# Patient Record
Sex: FEMALE | Race: WHITE | Employment: OTHER | RURAL
[De-identification: names, ages, dates, MRNs, and addresses within clinical notes are randomized per-mention and may not be internally consistent; named-entity substitution may affect disease eponyms.]

---

## 2018-01-10 ENCOUNTER — CLINICAL SUPPORT (OUTPATIENT)
Dept: FAMILY MEDICINE CLINIC | Age: 68
End: 2018-01-10

## 2018-01-10 DIAGNOSIS — Z23 ENCOUNTER FOR IMMUNIZATION: Primary | ICD-10-CM

## 2018-01-10 NOTE — PATIENT INSTRUCTIONS
If you have any questions regarding [employer], you may call [employer] support at [phone].

## 2018-01-10 NOTE — MR AVS SNAPSHOT
Visit Information Date & Time Provider Department Dept. Phone Encounter #  
 1/10/2018  7:45 AM [facility] NURSE Wes Zaragoza [id number] Upcoming Health Maintenance Date Due Hepatitis C Screening 1950 FOBT Q 1 YEAR AGE 50-75 7/27/1998 GLAUCOMA SCREENING Q2Y 7/27/2013 MEDICARE YEARLY EXAM 7/1/2017 BREAST CANCER SCRN MAMMOGRAM 5/16/2019 DTaP/Tdap/Td series (2 - Td) 3/21/2021 Allergies as of 1/10/2018  Review Complete On: 5/16/2017 By: Alesia Proctor No Known Allergies Current Immunizations  Reviewed on 6/30/2016 Name Date Influenza High Dose Vaccine PF 10/11/2017, 10/17/2016, 9/29/2015 Pneumococcal Conjugate (PCV-13) 6/30/2016 Pneumococcal Polysaccharide (PPSV-23) 1/23/2014 Tdap 1/10/2018, 3/21/2011 Zoster Vaccine, Live 7/9/2012 Not reviewed this visit You Were Diagnosed With   
  
 Codes Comments Encounter for immunization    -  Primary ICD-10-CM: H24 ICD-9-CM: V03.89 Vitals OB Status Smoking Status Postmenopausal Never Smoker Preferred Pharmacy Pharmacy Name Phone [address] [facility] [address] [phone] Your Updated Medication List  
  
   
This list is accurate as of: 1/10/18  3:38 PM.  Always use your most recent med list.  
  
  
  
  
 levothyroxine 100 mcg tablet Commonly known as:  SYNTHROID  
TAKE ONE TABLET BY MOUTH ONCE DAILY BEFORE  BREAKFAST We Performed the Following TETANUS, DIPHTHERIA TOXOIDS AND ACELLULAR PERTUSSIS VACCINE (TDAP), IN INDIVIDS. >=7, IM [id number] CPT(R)] Patient Instructions If you have any questions regarding [employer], you may call [employer] support at [phone]. Introducing [facility]!    
 [facility] introduces [employer] patient portal. Now you can access parts of your medical record, email your doctor's office, and request medication refills online. 1. In your internet browser, go to [email or web address] 2. Click on the First Time User? Click Here link in the Sign In box. You will see the New Member Sign Up page. 3. Enter your [employer] Access Code exactly as it appears below. You will not need to use this code after youve completed the sign-up process. If you do not sign up before the expiration date, you must request a new code. · [employer] Access Code: [email or web address] Expires: 4/10/2018  3:38 PM 
 
4. Enter the last four digits of your Social Security Number (xxxx) and Date of Birth (mm/dd/yyyy) as indicated and click Submit. You will be taken to the next sign-up page. 5. Create a [employer] ID. This will be your [employer] login ID and cannot be changed, so think of one that is secure and easy to remember. 6. Create a [employer] password. You can change your password at any time. 7. Enter your Password Reset Question and Answer. This can be used at a later time if you forget your password. 8. Enter your e-mail address. You will receive e-mail notification when new information is available in [address] 9. Click Sign Up. You can now view and download portions of your medical record. 10. Click the Download Summary menu link to download a portable copy of your medical information. If you have questions, please visit the Frequently Asked Questions section of the [employer] website. Remember, [employer] is NOT to be used for urgent needs. For medical emergencies, dial 911. Now available from your iPhone and Android! Please provide this summary of care documentation to your next provider. Your primary care clinician is listed as Shirley Degroot. If you have any questions after today's visit, please call [phone].

## 2018-02-20 RX ORDER — LEVOTHYROXINE SODIUM 100 UG/1
TABLET ORAL
Qty: 90 TAB | Refills: 4 | Status: SHIPPED | OUTPATIENT
Start: 2018-02-20 | End: 2019-05-07 | Stop reason: SDUPTHER

## 2018-02-20 NOTE — TELEPHONE ENCOUNTER
[phone] contact # per Disha Juan, need refill or an appt which ever is needed for her to get medication as she'll be out of town with daughter who is do to have baby last of March - 1st of April for the following [place], [place] for the following medication:   Levothyroxine 100 mcg tablet

## 2019-05-07 RX ORDER — LEVOTHYROXINE SODIUM 100 UG/1
TABLET ORAL
Qty: 30 TAB | Refills: 14 | Status: SHIPPED | OUTPATIENT
Start: 2019-05-07 | End: 2020-07-20

## 2021-02-10 ENCOUNTER — IMMUNIZATION (OUTPATIENT)
Dept: PEDIATRICS CLINIC | Age: 71
End: 2021-02-10
Payer: MEDICARE

## 2021-02-10 DIAGNOSIS — Z23 ENCOUNTER FOR IMMUNIZATION: Primary | ICD-10-CM

## 2021-02-10 PROCEDURE — 91301 COVID-19, MRNA, LNP-S, PF, 100MCG/0.5ML DOSE(MODERNA): CPT | Performed by: FAMILY MEDICINE

## 2021-02-10 PROCEDURE — 0011A COVID-19, MRNA, LNP-S, PF, 100MCG/0.5ML DOSE(MODERNA): CPT | Performed by: FAMILY MEDICINE

## 2021-03-12 ENCOUNTER — IMMUNIZATION (OUTPATIENT)
Dept: PEDIATRICS CLINIC | Age: 71
End: 2021-03-12
Payer: MEDICARE

## 2021-03-12 DIAGNOSIS — Z23 ENCOUNTER FOR IMMUNIZATION: Primary | ICD-10-CM

## 2021-03-12 PROCEDURE — 91301 COVID-19, MRNA, LNP-S, PF, 100MCG/0.5ML DOSE(MODERNA): CPT | Performed by: FAMILY MEDICINE

## 2021-03-12 PROCEDURE — 0012A COVID-19, MRNA, LNP-S, PF, 100MCG/0.5ML DOSE(MODERNA): CPT | Performed by: FAMILY MEDICINE

## 2021-05-05 DIAGNOSIS — N30.00 ACUTE CYSTITIS WITHOUT HEMATURIA: Primary | ICD-10-CM

## 2021-05-05 RX ORDER — CIPROFLOXACIN 250 MG/1
250 TABLET, FILM COATED ORAL EVERY 12 HOURS
Qty: 10 TAB | Refills: 1 | Status: SHIPPED | OUTPATIENT
Start: 2021-05-05 | End: 2021-05-10

## 2021-06-14 ENCOUNTER — VIRTUAL VISIT (OUTPATIENT)
Dept: FAMILY MEDICINE CLINIC | Age: 71
End: 2021-06-14
Payer: MEDICARE

## 2021-06-14 DIAGNOSIS — M25.551 RIGHT HIP PAIN: Primary | ICD-10-CM

## 2021-06-14 PROCEDURE — G2025 DIS SITE TELE SVCS RHC/FQHC: HCPCS | Performed by: INTERNAL MEDICINE

## 2021-06-14 RX ORDER — GLUCOSAMINE/CHONDR SU A SOD 750-600 MG
1 TABLET ORAL
COMMUNITY

## 2021-06-14 NOTE — Clinical Note
Stephon Norman-  Please call [facility] and schedule Luisa Plascencia for eval for right hip pain after July 1.  X rays at [facility]  Thanks  Karey Eddy

## 2021-06-14 NOTE — PROGRESS NOTES
Kevin Fischer is a 65 y.o. female evaluated via telephone on 6/14/2021. Consent:  She and/or health care decision maker is aware that that she may receive a bill for this telephone service, depending on her insurance coverage, and has provided verbal consent to proceed: Yes    A/P:  1. Right hip pain  Checking plain films. Likely soft tissue. Would benefit from PT  - XR HIP RT W OR WO PELV 2-3 VWS; Future  - REFERRAL TO PHYSICAL THERAPY    HPI: Tez Vaughn is a 64-year-old woman who injured her right hip about a month ago. Now she notes pain in her right hip when she has to get up out of a chair. Walking is essentially painless. But the pressure that she senses when activating her thigh muscles on the right is fairly intense and she is worried about falling. She denies any bruising. She is currently not taking any medication for pain. She is agreeable to getting a plain x-ray and for physical therapy. Declines medication for pain. I affirm this is a Patient Initiated Episode with an Established Patient who has not had a related appointment within my department in the past 7 days or scheduled within the next 24 hours. On this date 06/14/2021 I have spent 20 minutes reviewing previous notes, test results and face to face with the patient discussing the diagnosis and importance of compliance with the treatment plan as well as documenting on the day of the visit.      Note: not billable if this call serves to triage the patient into an appointment for the relevant concern      May Arenas MD

## 2021-06-14 NOTE — PROGRESS NOTES
Lyric Macias is a 65 y.o. female presenting for/with:    Chief Complaint   Patient presents with    Hip Pain     (R) hip C/O pain x 1 month. states about that time she stumbled and possibly \"twisted\" that hip. No falls. Pain worse after sitting for a prolonged time. Pain better once moving and ambulation.  Referral Request     Requests possible Xray        There were no vitals taken for this visit. Pain Scale: 1/10  Pain Location: (R) hip    1. Have you been to the ER, urgent care clinic since your last visit? Hospitalized since your last visit? NO    2. Have you seen or consulted any other health care providers outside of the [address] [place] [address] since your last visit? Include any pap smears or colon screening. NO    Symptom review:  NO  Fever   NO  Shaking chills  NO  Cough  NO  Body aches  NO  Coughing up blood  NO  Chest congestion  NO  Chest pain  NO  Shortness of breath  NO  Profound Loss of smell/taste  NO  Nausea/Vomiting   NO  Loose stool/Diarrhea  NO  any skin issues    Patient Risk Factors Reviewed as follows:  NO  have you been in Close contact with confirmed COVID19 patient   NO  History of recent travel to affected geographical areas within the past 14 days  NO  COPD  NO  Active Cancer/Leukemia/Lymphoma/Chemotherapy  NO  Oral steroid use  NO  Pregnant  NO  Diabetes Mellitus  NO  Heart disease  NO  Asthma  NO Health care worker at home  NO Health care worker  NO Is there a Pregnant Woman in the home  NO Dialysis pt in the home   NO a large number of people living in the home    Learning Assessment 6/14/2021   PRIMARY LEARNER Patient   PRIMARY LANGUAGE ENGLISH   LEARNER PREFERENCE PRIMARY LISTENING   ANSWERED BY patient   RELATIONSHIP SELF     Fall Risk Assessment, last 12 mths 6/14/2021   Able to walk? Yes   Fall in past 12 months? 0   Do you feel unsteady?  0   Are you worried about falling 0       3 most recent PHQ Screens 6/14/2021   Little interest or pleasure in doing things Not at all   Feeling down, depressed, irritable, or hopeless Not at all   Total Score PHQ 2 0     Abuse Screening Questionnaire 6/14/2021   Do you ever feel afraid of your partner? N   Are you in a relationship with someone who physically or mentally threatens you? N   Is it safe for you to go home? Y       ADL Assessment 6/14/2021   Feeding yourself No Help Needed   Getting from bed to chair No Help Needed   Getting dressed No Help Needed   Bathing or showering No Help Needed   Walk across the room (includes cane/walker) No Help Needed   Using the telphone No Help Needed   Taking your medications No Help Needed   Preparing meals No Help Needed   Managing money (expenses/bills) No Help Needed   Moderately strenuous housework (laundry) No Help Needed   Shopping for personal items (toiletries/medicines) No Help Needed   Shopping for groceries No Help Needed   Driving No Help Needed   Climbing a flight of stairs No Help Needed   Getting to places beyond walking distances No Help Needed      Living will on file. No advance directive on file.  Emergency contact verified

## 2021-06-15 ENCOUNTER — HOSPITAL ENCOUNTER (OUTPATIENT)
Dept: GENERAL RADIOLOGY | Age: 71
Discharge: HOME | End: 2021-06-15
Payer: MEDICARE

## 2021-06-15 DIAGNOSIS — M25.551 RIGHT HIP PAIN: ICD-10-CM

## 2021-06-15 PROCEDURE — 73502 X-RAY EXAM HIP UNI 2-3 VIEWS: CPT

## 2021-08-23 ENCOUNTER — HOSPITAL ENCOUNTER (OUTPATIENT)
Dept: MAMMOGRAPHY | Age: 71
Discharge: HOME | End: 2021-08-23
Attending: INTERNAL MEDICINE
Payer: MEDICARE

## 2021-08-23 VITALS — WEIGHT: 130 LBS | BODY MASS INDEX: 20.98 KG/M2

## 2021-08-23 DIAGNOSIS — Z12.31 VISIT FOR SCREENING MAMMOGRAM: ICD-10-CM

## 2021-08-23 PROCEDURE — 77063 BREAST TOMOSYNTHESIS BI: CPT

## 2021-11-06 NOTE — PROGRESS NOTES
Ms. Donovan Paulino is a 66 y.o. female who is here for evaluation of No chief complaint on file. .       ASSESSMENT AND PLAN:    1. Medicare annual wellness visit, subsequent  2. Encounter for screening for colorectal malignant neoplasm  Will encourage patient to complete cologuard screening if not colonoscopy  3. Acquired hypothyroidism  She is due for labs  4. Pure hypercholesterolemia  Due for assessment. Orders Placed This Encounter    CBC WITH AUTOMATED DIFF     Standing Status:   Future     Standing Expiration Date:   12/10/2021    LIPID PANEL     Standing Status:   Future     Standing Expiration Date:       METABOLIC PANEL, COMPREHENSIVE     Standing Status:   Future     Standing Expiration Date:   12/10/2021    TSH 3RD GENERATION     Standing Status:   Future     Standing Expiration Date:   12/10/2021           HPI 70-year-old  woman with hypothyroidism and hyperlipidemia. There is a history of osteoporosis. Currently taking vitamin D. She arrives for subsequent annual wellness visit with age-appropriate screening and laboratory assessment and clinical assessment of her thyroid status, lipids and other concerns. ROS:  Denies fever, chills, cough, chest pain, SOB,  nausea, vomiting, or diarrhea. Denies wt loss, wt gain, hemoptysis, hematochezia or melena. Physical Examination:    Visit Vitals  /78 (BP 1 Location: Left upper arm, BP Patient Position: Sitting, BP Cuff Size: Adult)   Pulse 70   Temp (!) 96.6 °F (35.9 °C) (Temporal)   Resp 18   Ht 5' 6\" (1.676 m)   Wt 131 lb 6.4 oz (59.6 kg)   SpO2 98%   BMI 21.21 kg/m²      General:  Alert, cooperative, no distress. Head:  Normocephalic, without obvious abnormality, atraumatic. Eyes:  Conjunctivae/corneas clear. Pupils equal, round, reactive to light. Extraocular movements intact. Lungs:   Clear to auscultation bilaterally. Chest wall:  No tenderness or deformity. Cardiac:  RRR   Abdomen:   Soft, non-tender.  Bowel sounds normal. No masses. No organomegaly. Extremities: Extremities normal, atraumatic, no cyanosis or edema. Pulses: 2+ and symmetric all extremities. Skin: Skin color, texture, turgor normal. No rashes or lesions. Lymph nodes: Cervical, supraclavicular, and axillary nodes normal.   Neurologic: CNII-XII intact. Normal strength, sensation, and reflexes throughout. On this date 11/08/2021 I have spent 30 minutes reviewing previous notes, test results and face to face with the patient discussing the diagnosis and importance of compliance with the treatment plan as well as documenting on the day of the visit.     Zara Hernandez MD FACP    (signed electronically) on 11/8/2021 at 9:35 AM

## 2021-11-08 ENCOUNTER — OFFICE VISIT (OUTPATIENT)
Dept: FAMILY MEDICINE CLINIC | Age: 71
End: 2021-11-08
Payer: MEDICARE

## 2021-11-08 VITALS
OXYGEN SATURATION: 98 % | HEIGHT: 66 IN | TEMPERATURE: 96.6 F | SYSTOLIC BLOOD PRESSURE: 110 MMHG | BODY MASS INDEX: 21.12 KG/M2 | DIASTOLIC BLOOD PRESSURE: 78 MMHG | RESPIRATION RATE: 18 BRPM | WEIGHT: 131.4 LBS | HEART RATE: 70 BPM

## 2021-11-08 DIAGNOSIS — Z12.12 ENCOUNTER FOR SCREENING FOR COLORECTAL MALIGNANT NEOPLASM: ICD-10-CM

## 2021-11-08 DIAGNOSIS — Z12.11 ENCOUNTER FOR SCREENING FOR COLORECTAL MALIGNANT NEOPLASM: ICD-10-CM

## 2021-11-08 DIAGNOSIS — Z00.00 MEDICARE ANNUAL WELLNESS VISIT, SUBSEQUENT: Primary | ICD-10-CM

## 2021-11-08 DIAGNOSIS — E03.9 ACQUIRED HYPOTHYROIDISM: ICD-10-CM

## 2021-11-08 DIAGNOSIS — E78.00 PURE HYPERCHOLESTEROLEMIA: ICD-10-CM

## 2021-11-08 LAB
ALBUMIN SERPL-MCNC: 4.1 G/DL (ref 3.5–5)
ALBUMIN/GLOB SERPL: 1.3 {RATIO} (ref 1.1–2.2)
ALP SERPL-CCNC: 74 U/L (ref 45–117)
ALT SERPL-CCNC: 24 U/L (ref 12–78)
ANION GAP SERPL CALC-SCNC: 4 MMOL/L (ref 5–15)
AST SERPL-CCNC: 19 U/L (ref 15–37)
BASOPHILS # BLD: 0.1 K/UL (ref 0–0.1)
BASOPHILS NFR BLD: 1 % (ref 0–1)
BILIRUB SERPL-MCNC: 0.4 MG/DL (ref 0.2–1)
BUN SERPL-MCNC: 17 MG/DL (ref 6–20)
BUN/CREAT SERPL: 18 (ref 12–20)
CALCIUM SERPL-MCNC: 9.9 MG/DL (ref 8.5–10.1)
CHLORIDE SERPL-SCNC: 104 MMOL/L (ref 97–108)
CHOLEST SERPL-MCNC: 221 MG/DL
CO2 SERPL-SCNC: 31 MMOL/L (ref 21–32)
CREAT SERPL-MCNC: 0.92 MG/DL (ref 0.55–1.02)
DIFFERENTIAL METHOD BLD: ABNORMAL
EOSINOPHIL # BLD: 0.3 K/UL (ref 0–0.4)
EOSINOPHIL NFR BLD: 5 % (ref 0–7)
ERYTHROCYTE [DISTWIDTH] IN BLOOD BY AUTOMATED COUNT: 13.1 % (ref 11.5–14.5)
GLOBULIN SER CALC-MCNC: 3.2 G/DL (ref 2–4)
GLUCOSE SERPL-MCNC: 105 MG/DL (ref 65–100)
HCT VFR BLD AUTO: 43.6 % (ref 35–47)
HDLC SERPL-MCNC: 54 MG/DL
HDLC SERPL: 4.1 {RATIO} (ref 0–5)
HGB BLD-MCNC: 14 G/DL (ref 11.5–16)
IMM GRANULOCYTES # BLD AUTO: 0.1 K/UL (ref 0–0.04)
IMM GRANULOCYTES NFR BLD AUTO: 1 % (ref 0–0.5)
LDLC SERPL CALC-MCNC: 141.6 MG/DL (ref 0–100)
LYMPHOCYTES # BLD: 1.5 K/UL (ref 0.8–3.5)
LYMPHOCYTES NFR BLD: 26 % (ref 12–49)
MCH RBC QN AUTO: 30.6 PG (ref 26–34)
MCHC RBC AUTO-ENTMCNC: 32.1 G/DL (ref 30–36.5)
MCV RBC AUTO: 95.4 FL (ref 80–99)
MONOCYTES # BLD: 0.5 K/UL (ref 0–1)
MONOCYTES NFR BLD: 9 % (ref 5–13)
NEUTS SEG # BLD: 3.4 K/UL (ref 1.8–8)
NEUTS SEG NFR BLD: 58 % (ref 32–75)
NRBC # BLD: 0 K/UL (ref 0–0.01)
NRBC BLD-RTO: 0 PER 100 WBC
PLATELET # BLD AUTO: 264 K/UL (ref 150–400)
PMV BLD AUTO: 11.8 FL (ref 8.9–12.9)
POTASSIUM SERPL-SCNC: 4.1 MMOL/L (ref 3.5–5.1)
PROT SERPL-MCNC: 7.3 G/DL (ref 6.4–8.2)
RBC # BLD AUTO: 4.57 M/UL (ref 3.8–5.2)
SODIUM SERPL-SCNC: 139 MMOL/L (ref 136–145)
TRIGL SERPL-MCNC: 127 MG/DL (ref ?–150)
TSH SERPL DL<=0.05 MIU/L-ACNC: 0.31 UIU/ML (ref 0.36–3.74)
VLDLC SERPL CALC-MCNC: 25.4 MG/DL
WBC # BLD AUTO: 5.8 K/UL (ref 3.6–11)

## 2021-11-08 PROCEDURE — G0439 PPPS, SUBSEQ VISIT: HCPCS | Performed by: INTERNAL MEDICINE

## 2021-11-08 PROCEDURE — 99214 OFFICE O/P EST MOD 30 MIN: CPT | Performed by: INTERNAL MEDICINE

## 2021-11-08 NOTE — PROGRESS NOTES
Gi Mendoza is a 66 y.o. female presenting for/with:    No chief complaint on file. Visit Vitals  /78 (BP 1 Location: Left upper arm, BP Patient Position: Sitting, BP Cuff Size: Adult)   Pulse 70   Temp (!) 96.6 °F (35.9 °C) (Temporal)   Resp 18   Ht 5' 6\" (1.676 m)   Wt 131 lb 6.4 oz (59.6 kg)   SpO2 98%   BMI 21.21 kg/m²     Pain Scale: 0 - No pain/10  Pain Location:     1. Have you been to the ER, urgent care clinic since your last visit? Hospitalized since your last visit? NO    2. Have you seen or consulted any other health care providers outside of the [address] [place] [address] since your last visit? Include any pap smears or colon screening. NO    Symptom review:  NO  Fever   NO  Shaking chills  NO  Cough  NO  Body aches  NO  Coughing up blood  NO  Chest congestion  NO  Chest pain  NO  Shortness of breath  NO  Profound Loss of smell/taste  NO  Nausea/Vomiting   NO  Loose stool/Diarrhea  NO  any skin issues    Patient Risk Factors Reviewed as follows:  NO  have you been in Close contact with confirmed COVID19 patient   NO  History of recent travel to affected geographical areas within the past 14 days  NO  COPD  NO  Active Cancer/Leukemia/Lymphoma/Chemotherapy  NO  Oral steroid use  NO  Pregnant  NO  Diabetes Mellitus  NO  Heart disease  NO  Asthma  NO Health care worker at home  [address] care worker  NO Is there a Pregnant Woman in the home  NO Dialysis pt in the home   NO a large number of people living in the home    Learning Assessment 11/8/2021   PRIMARY LEARNER Patient   PRIMARY LANGUAGE ENGLISH   LEARNER PREFERENCE PRIMARY LISTENING     PICTURES   ANSWERED BY patient   RELATIONSHIP SELF     Fall Risk Assessment, last 12 mths 11/8/2021   Able to walk? Yes   Fall in past 12 months? 0   Do you feel unsteady?  0   Are you worried about falling 0       3 most recent PHQ Screens 11/8/2021   Little interest or pleasure in doing things Not at all   Feeling down, depressed, irritable, or hopeless Not at all   Total Score PHQ 2 0     Abuse Screening Questionnaire 11/8/2021   Do you ever feel afraid of your partner? N   Are you in a relationship with someone who physically or mentally threatens you? N   Is it safe for you to go home? Y       ADL Assessment 11/8/2021   Feeding yourself No Help Needed   Getting from bed to chair No Help Needed   Getting dressed No Help Needed   Bathing or showering No Help Needed   Walk across the room (includes cane/walker) No Help Needed   Using the telphone No Help Needed   Taking your medications No Help Needed   Preparing meals No Help Needed   Managing money (expenses/bills) No Help Needed   Moderately strenuous housework (laundry) No Help Needed   Shopping for personal items (toiletries/medicines) No Help Needed   Shopping for groceries No Help Needed   Driving No Help Needed   Climbing a flight of stairs No Help Needed   Getting to places beyond walking distances No Help Needed      Advance Care Planning 11/8/2021   Patient's Healthcare Decision Maker is: Named in scanned ACP document   Confirm Advance Directive Yes, on file   Patient Would Like to Complete Advance Directive -        This is the Subsequent Medicare Annual Wellness Exam, performed 12 months or more after the Initial AWV or the last Subsequent AWV    I have reviewed the patient's medical history in detail and updated the computerized patient record. Assessment/Plan   Education and counseling provided:  Are appropriate based on today's review and evaluation    1. Medicare annual wellness visit, subsequent  2. Encounter for screening for colorectal malignant neoplasm  3. Acquired hypothyroidism  4.  Pure hypercholesterolemia       Depression Risk Factor Screening     3 most recent PHQ Screens 11/8/2021   Little interest or pleasure in doing things Not at all   Feeling down, depressed, irritable, or hopeless Not at all   Total Score PHQ 2 0       Alcohol Risk Screen    Do you average more than 1 drink per night or more than 7 drinks a week:  No    On any one occasion in the past three months have you have had more than 3 drinks containing alcohol:  No        Functional Ability and Level of Safety    Hearing: Hearing is good. Activities of Daily Living: The home contains: no safety equipment. Patient does total self care      Ambulation: with no difficulty     Fall Risk:  Fall Risk Assessment, last 12 mths 11/8/2021   Able to walk? Yes   Fall in past 12 months? 0   Do you feel unsteady? 0   Are you worried about falling 0      Abuse Screen:  Patient is not abused       Cognitive Screening    Has your family/caregiver stated any concerns about your memory: no       Health Maintenance Due     Health Maintenance Due   Topic Date Due    Colorectal Cancer Screening Combo  Never done       Patient Care Team   Patient Care Team:  Reji Patricia MD as PCP - General (Internal Medicine)  Reji Patricia MD as PCP [facility] Empaneled Provider    History     Patient Active Problem List   Diagnosis Code    Hypothyroidism E03.9    Hyperlipidemia E78.5    Postmenopausal osteoporosis M81.0    Advance directive on file Z78.9     Past Medical History:   Diagnosis Date    Fracture of right wrist     Hyperlipidemia     Hypothyroidism     Menopause     Postmenopausal osteoporosis     Prolapse of female bladder, acquired     uterine prolapse      Past Surgical History:   Procedure Laterality Date    HX APPENDECTOMY      HX CHOLECYSTECTOMY      HX DILATION AND CURETTAGE       Current Outpatient Medications   Medication Sig Dispense Refill    Euthyrox 100 mcg tablet TAKE 1 TABLET BY MOUTH ONCE DAILY BEFORE BREAKFAST 90 Tablet 5    glucosamine/chondr ricardo A sod (glucosamine-chondroitin) 750-600 mg tab Take  by mouth.  States takes glucosamine approx 3 times weekly       No Known Allergies    Family History   Problem Relation Age of Onset    Cancer Father         liver    Hypertension Mother      Social History     Tobacco Use    Smoking status: Never Smoker    Smokeless tobacco: Never Used   Substance Use Topics    Alcohol use: Yes     Comment: Occ a glass of wine         Ester Payan

## 2021-11-08 NOTE — PATIENT INSTRUCTIONS

## 2021-11-09 DIAGNOSIS — E03.9 ACQUIRED HYPOTHYROIDISM: Primary | ICD-10-CM

## 2021-11-09 RX ORDER — LEVOTHYROXINE SODIUM 88 UG/1
88 TABLET ORAL
Qty: 90 TABLET | Refills: 4 | Status: SHIPPED | OUTPATIENT
Start: 2021-11-09

## 2021-11-09 NOTE — PROGRESS NOTES
Patient identified x2 factors and notified of lab results. All questions answered at this time.  Patient will need lab only appt in 3 months

## 2022-02-21 ENCOUNTER — DOCUMENTATION ONLY (OUTPATIENT)
Dept: FAMILY MEDICINE CLINIC | Age: 72
End: 2022-02-21

## 2022-02-21 DIAGNOSIS — Z12.11 SCREENING FOR COLON CANCER: Primary | ICD-10-CM

## 2022-02-23 LAB — HEMOCCULT STL QL IA: NEGATIVE

## 2022-07-01 ENCOUNTER — OFFICE VISIT (OUTPATIENT)
Dept: FAMILY MEDICINE CLINIC | Age: 72
End: 2022-07-01
Payer: MEDICARE

## 2022-07-01 VITALS
TEMPERATURE: 98.2 F | BODY MASS INDEX: 20.83 KG/M2 | DIASTOLIC BLOOD PRESSURE: 77 MMHG | HEIGHT: 66 IN | SYSTOLIC BLOOD PRESSURE: 126 MMHG | OXYGEN SATURATION: 98 % | RESPIRATION RATE: 18 BRPM | WEIGHT: 129.6 LBS | HEART RATE: 83 BPM

## 2022-07-01 DIAGNOSIS — R73.09 ELEVATED GLUCOSE: ICD-10-CM

## 2022-07-01 DIAGNOSIS — E03.9 ACQUIRED HYPOTHYROIDISM: Primary | ICD-10-CM

## 2022-07-01 DIAGNOSIS — N81.4 UTERINE PROLAPSE: ICD-10-CM

## 2022-07-01 DIAGNOSIS — E78.2 MIXED HYPERLIPIDEMIA: ICD-10-CM

## 2022-07-01 DIAGNOSIS — Z01.818 PRE-OP TESTING: ICD-10-CM

## 2022-07-01 DIAGNOSIS — H54.7 DIMINISHED VISION: ICD-10-CM

## 2022-07-01 DIAGNOSIS — E78.00 PURE HYPERCHOLESTEROLEMIA: ICD-10-CM

## 2022-07-01 PROCEDURE — 93010 ELECTROCARDIOGRAM REPORT: CPT | Performed by: INTERNAL MEDICINE

## 2022-07-01 PROCEDURE — 99214 OFFICE O/P EST MOD 30 MIN: CPT | Performed by: INTERNAL MEDICINE

## 2022-07-01 NOTE — PROGRESS NOTES
Miranda Orona is a 66 y.o. female presenting for/with:    Chief Complaint   Patient presents with    Pre-op Exam     Pre-op for [place] [address] Dr Ryanne Hadley on 8/2/2022 - Fax number [phone]. Procedure vaginal hysterectomy, possible bilateral salpingo-ooophorectomy, possible uterosacral ligament suspension    Pre-op Exam     Pre-op cataract on 8/17/2022 with Dr Avril Hernandez        Visit Vitals  /77 (BP 1 Location: Left upper arm, BP Patient Position: Sitting, BP Cuff Size: Adult long)   Pulse 83   Temp 98.2 °F (36.8 °C) (Temporal)   Resp 18   Ht 5' 6\" (1.676 m)   Wt 129 lb 9.6 oz (58.8 kg)   SpO2 98%   BMI 20.92 kg/m²     Pain Scale: 0 - No pain/10  Pain Location:     1. \"Have you been to the ER, urgent care clinic since your last visit? Hospitalized since your last visit? \" No    2. \"Have you seen or consulted any other health care providers outside of the [address] [place] [address] since your last visit? \" No     3. For patients aged 39-70: Has the patient had a colonoscopy / FIT/ Cologuard? Yes - no Care Gap present      If the patient is female:    4. For patients aged 41-77: Has the patient had a mammogram within the past 2 years? Yes - no Care Gap present      5. For patients aged 21-65: Has the patient had a pap smear?  NA - based on age or sex      Symptom review:  NO  Fever   NO  Shaking chills  NO  Cough  NO  Body aches  NO  Coughing up blood  NO  Chest congestion  NO  Chest pain  NO  Shortness of breath  NO  Profound Loss of smell/taste  NO  Nausea/Vomiting   NO  Loose stool/Diarrhea  NO  any skin issues    Patient Risk Factors Reviewed as follows:  NO  have you been in Close contact with confirmed COVID19 patient   NO  History of recent travel to affected geographical areas within the past 14 days  NO  COPD  NO  Active Cancer/Leukemia/Lymphoma/Chemotherapy  NO  Oral steroid use  NO  Pregnant  NO  Diabetes Mellitus  YES  Heart disease  NO  Asthma  NO Health care worker at home  [address] care worker  NO Is there a Pregnant Woman in the home  NO Dialysis pt in the home   NO a large number of people living in the home    Learning Assessment 11/8/2021   PRIMARY LEARNER Patient   PRIMARY LANGUAGE ENGLISH   LEARNER PREFERENCE PRIMARY LISTENING     PICTURES   ANSWERED BY patient   RELATIONSHIP SELF     Fall Risk Assessment, last 12 mths 7/1/2022   Able to walk? Yes   Fall in past 12 months? 0   Do you feel unsteady? 0   Are you worried about falling 0       3 most recent PHQ Screens 7/1/2022   Little interest or pleasure in doing things Not at all   Feeling down, depressed, irritable, or hopeless Not at all   Total Score PHQ 2 0     Abuse Screening Questionnaire 7/1/2022   Do you ever feel afraid of your partner? N   Are you in a relationship with someone who physically or mentally threatens you? N   Is it safe for you to go home?  Y       ADL Assessment 7/1/2022   Feeding yourself No Help Needed   Getting from bed to chair No Help Needed   Getting dressed No Help Needed   Bathing or showering No Help Needed   Walk across the room (includes cane/walker) No Help Needed   Using the telphone No Help Needed   Taking your medications No Help Needed   Preparing meals No Help Needed   Managing money (expenses/bills) No Help Needed   Moderately strenuous housework (laundry) No Help Needed   Shopping for personal items (toiletries/medicines) No Help Needed   Shopping for groceries No Help Needed   Driving No Help Needed   Climbing a flight of stairs No Help Needed   Getting to places beyond walking distances No Help Needed      Advance Care Planning 11/8/2021   Patient's Healthcare Decision Maker is: Named in scanned ACP document   Confirm Advance Directive Yes, on file   Patient Would Like to Complete Advance Directive -

## 2022-07-02 LAB
ALBUMIN SERPL-MCNC: 4 G/DL (ref 3.5–5)
ALBUMIN/GLOB SERPL: 1.3 {RATIO} (ref 1.1–2.2)
ALP SERPL-CCNC: 77 U/L (ref 45–117)
ALT SERPL-CCNC: 22 U/L (ref 12–78)
ANION GAP SERPL CALC-SCNC: 8 MMOL/L (ref 5–15)
APPEARANCE UR: CLEAR
APTT PPP: 28.1 SEC (ref 22.1–31)
AST SERPL-CCNC: 23 U/L (ref 15–37)
BACTERIA URNS QL MICRO: NEGATIVE /HPF
BASOPHILS # BLD: 0 K/UL (ref 0–0.1)
BASOPHILS NFR BLD: 1 % (ref 0–1)
BILIRUB SERPL-MCNC: 0.4 MG/DL (ref 0.2–1)
BILIRUB UR QL: NEGATIVE
BUN SERPL-MCNC: 19 MG/DL (ref 6–20)
BUN/CREAT SERPL: 20 (ref 12–20)
CALCIUM SERPL-MCNC: 9.1 MG/DL (ref 8.5–10.1)
CHLORIDE SERPL-SCNC: 105 MMOL/L (ref 97–108)
CHOLEST SERPL-MCNC: 202 MG/DL
CO2 SERPL-SCNC: 29 MMOL/L (ref 21–32)
COLOR UR: NORMAL
CREAT SERPL-MCNC: 0.96 MG/DL (ref 0.55–1.02)
DIFFERENTIAL METHOD BLD: NORMAL
EOSINOPHIL # BLD: 0.1 K/UL (ref 0–0.4)
EOSINOPHIL NFR BLD: 3 % (ref 0–7)
EPITH CASTS URNS QL MICRO: NORMAL /LPF
ERYTHROCYTE [DISTWIDTH] IN BLOOD BY AUTOMATED COUNT: 13.4 % (ref 11.5–14.5)
EST. AVERAGE GLUCOSE BLD GHB EST-MCNC: 120 MG/DL
GLOBULIN SER CALC-MCNC: 3 G/DL (ref 2–4)
GLUCOSE SERPL-MCNC: 104 MG/DL (ref 65–100)
GLUCOSE UR STRIP.AUTO-MCNC: NEGATIVE MG/DL
HBA1C MFR BLD: 5.8 % (ref 4–5.6)
HCT VFR BLD AUTO: 44.6 % (ref 35–47)
HDLC SERPL-MCNC: 52 MG/DL
HDLC SERPL: 3.9 {RATIO} (ref 0–5)
HGB BLD-MCNC: 14.5 G/DL (ref 11.5–16)
HGB UR QL STRIP: NEGATIVE
HYALINE CASTS URNS QL MICRO: NORMAL /LPF (ref 0–5)
IMM GRANULOCYTES # BLD AUTO: 0 K/UL
IMM GRANULOCYTES NFR BLD AUTO: 0 %
INR PPP: 1 (ref 0.9–1.1)
KETONES UR QL STRIP.AUTO: NEGATIVE MG/DL
LDLC SERPL CALC-MCNC: 109.4 MG/DL (ref 0–100)
LEUKOCYTE ESTERASE UR QL STRIP.AUTO: NEGATIVE
LYMPHOCYTES # BLD: 1.3 K/UL (ref 0.8–3.5)
LYMPHOCYTES NFR BLD: 30 % (ref 12–49)
MCH RBC QN AUTO: 31.8 PG (ref 26–34)
MCHC RBC AUTO-ENTMCNC: 32.5 G/DL (ref 30–36.5)
MCV RBC AUTO: 97.8 FL (ref 80–99)
MONOCYTES # BLD: 0.4 K/UL (ref 0–1)
MONOCYTES NFR BLD: 10 % (ref 5–13)
NEUTS SEG # BLD: 2.6 K/UL (ref 1.8–8)
NEUTS SEG NFR BLD: 56 % (ref 32–75)
NITRITE UR QL STRIP.AUTO: NEGATIVE
NRBC # BLD: 0 K/UL (ref 0–0.01)
NRBC BLD-RTO: 0 PER 100 WBC
PH UR STRIP: 7.5 [PH] (ref 5–8)
PLATELET # BLD AUTO: 261 K/UL (ref 150–400)
PMV BLD AUTO: 11.4 FL (ref 8.9–12.9)
POTASSIUM SERPL-SCNC: 4 MMOL/L (ref 3.5–5.1)
PROT SERPL-MCNC: 7 G/DL (ref 6.4–8.2)
PROT UR STRIP-MCNC: NEGATIVE MG/DL
PROTHROMBIN TIME: 10.9 SEC (ref 9–11.1)
RBC # BLD AUTO: 4.56 M/UL (ref 3.8–5.2)
RBC #/AREA URNS HPF: NORMAL /HPF (ref 0–5)
RBC MORPH BLD: NORMAL
SODIUM SERPL-SCNC: 142 MMOL/L (ref 136–145)
SP GR UR REFRACTOMETRY: 1.02 (ref 1–1.03)
T4 SERPL-MCNC: 10.3 UG/DL (ref 4.8–13.9)
THERAPEUTIC RANGE,PTTT: NORMAL SECS (ref 58–77)
TRIGL SERPL-MCNC: 203 MG/DL (ref ?–150)
TSH SERPL DL<=0.05 MIU/L-ACNC: 0.58 UIU/ML (ref 0.36–3.74)
UA: UC IF INDICATED,UAUC: NORMAL
UROBILINOGEN UR QL STRIP.AUTO: 0.2 EU/DL (ref 0.2–1)
VLDLC SERPL CALC-MCNC: 40.6 MG/DL
WBC # BLD AUTO: 4.4 K/UL (ref 3.6–11)
WBC URNS QL MICRO: NORMAL /HPF (ref 0–4)

## 2022-07-28 NOTE — PERIOP NOTES
[address] INSTRUCTIONS    Surgery Date:   8/2/2022    Your surgeon's office or [facility] staff will call you between 4 PM- 8 PM the day before surgery with your arrival time. If your surgery is on a Monday, you will receive a call the preceding Friday. Please report to [facility] Patient Access/Admitting on the 1st floor. Bring your insurance card, photo identification, and any copayment ( if applicable). If you are going home the same day of your surgery, you must have a responsible adult to drive you home. You need to have a responsible adult to stay with you the first 24 hours after surgery and you should not drive a car for 24 hours following your surgery. Nothing to eat or drink after midnight the night before surgery. This includes no water, gum, mints, coffee, juice, etc.  Please note special instructions, if applicable, below for medications. Do NOT drink alcohol or smoke 24 hours before surgery. STOP smoking for 14 days prior as it helps with breathing and healing after surgery. If you are being admitted to the hospital, please leave personal belongings/luggage in your car until you have an assigned hospital room number. Please wear comfortable clothes. Wear your glasses instead of contacts. We ask that all money, jewelry and valuables be left at home. Wear no make up, particularly mascara, the day of surgery. All body piercings, rings, and jewelry need to be removed and left at home. Please remove any nail polish or artificial nails from your fingernails. Please wear your hair loose or down. Please no pony-tails, buns, or any metal hair accessories. If you shower the morning of surgery, please do not apply any lotions or powders afterwards. You may wear deodorant, unless having breast surgery. Do not shave any body area within 24 hours of your surgery. Please follow all instructions to avoid any potential surgical cancellation.   Should your physical condition change, (i.e. fever, cold, flu, etc.) please notify your surgeon as soon as possible. It is important to be on time. If a situation occurs where you may be delayed, please call:  [phone] / [phone] on the day of surgery. The Preadmission Testing staff can be reached at [phone]. Special instructions: N/A      No current facility-administered medications for this encounter. Current Outpatient Medications   Medication Sig    cholecalciferol, vitamin D3, (VITAMIN D3 PO) Take 1 Capsule by mouth every morning. ascorbic acid (VITAMIN C PO) Take 1 Tablet by mouth every morning. MAGNESIUM PO Take 1 Tablet by mouth every morning. levothyroxine (SYNTHROID) 88 mcg tablet Take 1 Tablet by mouth Daily (before breakfast). glucosamine/chondr ricardo A sod (glucosamine-chondroitin) 750-600 mg tab Take 1 Tablet by mouth every morning. States takes glucosamine approx 3 times weekly       YOU MUST ONLY TAKE THESE MEDICATIONS THE MORNING OF SURGERY WITH A SIP OF WATER: LEVOTHYROXINE  MEDICATIONS TO TAKE THE MORNING OF SURGERY ONLY IF NEEDED: N/A  HOLD these prescription medications BEFORE Surgery: N/A  Ask your surgeon/prescribing physician about when/if to STOP taking these medications: N/A  Stop all vitamins, herbal medicines and Aspirin containing products 7 days prior to surgery. Stop any non-steroidal anti-inflammatory drugs (i.e. Ibuprofen, Naproxen, Advil, Aleve) 3 days before surgery. You may take Tylenol. If you are currently taking Plavix, Coumadin, or any other blood-thinning/anticoagulant medication contact your prescribing physician for instructions. Preventing Infections Before and After - Your Surgery    IMPORTANT INSTRUCTIONS      You play an important role in your health and preparation for surgery. To reduce the germs on your skin you will need to shower with CHG soap (Chorhexidine gluconate 4%) two times before surgery.     CHG soap (Hibiclens, Hex-A-Clens or store brand)  CHG soap will be provided at your Preadmission Testing (PAT) appointment. If you do not have a PAT appointment before surgery, you may arrange to  CHG soap from our office or purchase CHG soap at a pharmacy, grocery or department store. You need to purchase TWO 4 ounce bottles to use for your 2 showers. Steps to follow:  Abilio Fake your hair with your normal shampoo and your body with regular soap and rinse well to remove shampoo and soap from your skin. Wet a clean washcloth and turn off the shower. Put CHG soap on washcloth and apply to your entire body from the neck down. Do not use on your head, face or private parts(genitals). Do not use CHG soap on open sores, wounds or areas of skin irritation. Wash you body gently for 5 minutes. Do not wash your skin too hard. This soap does not create lather. Pay special attention to your underarms and from your belly button to your feet. Turn the shower back on and rinse well to get CHG soap off your body. Pat your skin dry with a clean, dry towel. Do not apply lotions or moisturizer. Put on clean clothes and sleep on fresh bed sheets and do not allow pets to sleep with you. Shower with CHG soap 2 times before your surgery  The evening before your surgery  The morning of your surgery      Tips to help prevent infections after your surgery:  Protect your surgical wound from germs:  Hand washing is the most important thing you and your caregivers can do to prevent infections. Keep your bandage clean and dry! Do not touch your surgical wound. Use clean, freshly washed towels and washcloths every time you shower; do not share bath linens with others. Until your surgical wound is healed, wear clothing and sleep on bed linens each day that are clean and freshly washed. Do not allow pets to sleep in your bed with you or touch your surgical wound. Do not smoke - smoking delays wound healing. This may be a good time to stop smoking.   If you have diabetes, it is important for you to manage your blood sugar levels properly before your surgery as well as after your surgery. Poorly managed blood sugar levels slow down wound healing and prevent you from healing completely. Patient Information Regarding COVID Restrictions    Day of Procedure    Please park in the parking deck or any designated visitor parking lot. Enter the facility through the Main Entrance of the hospital.  On the day of surgery, please provide the cell phone number of the person who will be waiting for you to the Patient Access representative at the time of registration. Please wear a mask on the day of your procedure. We are now allowing two designated visitors per stay. Pediatric patients may have 2 designated visitors. These two people may come in with you on the day of your procedure. The designated visitor must also wear a mask. Once your procedure and the immediate recovery period is completed, a nurse in the recovery area will contact your designated visitor to inform them of your room number or to otherwise review other pertinent information regarding your care. Social distancing practices are to be adhered to in waiting areas and the cafeteria. The patient was contacted  via phone. She verbalized understanding of all instructions does not  need reinforcement.

## 2022-07-29 ENCOUNTER — LAB ONLY (OUTPATIENT)
Dept: FAMILY MEDICINE CLINIC | Age: 72
End: 2022-07-29
Payer: MEDICARE

## 2022-07-29 ENCOUNTER — TELEPHONE (OUTPATIENT)
Dept: FAMILY MEDICINE CLINIC | Age: 72
End: 2022-07-29

## 2022-07-29 DIAGNOSIS — Z20.822 CONTACT WITH AND (SUSPECTED) EXPOSURE TO COVID-19: Primary | ICD-10-CM

## 2022-07-29 DIAGNOSIS — Z01.818 PRE-OP TESTING: ICD-10-CM

## 2022-07-29 LAB
EXP DATE SOLUTION: NORMAL
EXP DATE SWAB: NORMAL
LOT NUMBER SOLUTION: NORMAL
LOT NUMBER SWAB: NORMAL
SARS-COV-2 RNA POC: NEGATIVE

## 2022-07-29 PROCEDURE — 87635 SARS-COV-2 COVID-19 AMP PRB: CPT | Performed by: INTERNAL MEDICINE

## 2022-07-29 NOTE — H&P (VIEW-ONLY)
Patient in today for pre-procedure testing. States was in close contact with someone (+) COVID. NAAT testing completed. Aneita Buerger is a 74 y.o. female presenting for/with:    No chief complaint on file.       Symptom review:    NO  Fever   NO  Shaking chills  NO  Cough  NO  Body aches  NO  Coughing up blood  NO  Chest congestion  NO  Chest pain  NO  Shortness of breath  NO  Profound Loss of smell/taste  NO  Nausea/Vomiting   NO  Loose stool/Diarrhea  NO  any skin issues    Patient Risk Factors Reviewed as follows:  NO  have you been in Close contact with confirmed COVID19 patient   NO  History of recent travel to affected geographical areas within the past 14 days  NO  COPD  NO  Active Cancer/Leukemia/Lymphoma/Chemotherapy  NO  Oral steroid use  NO  Pregnant  NO  Diabetes Mellitus  YES  Heart disease  NO  Asthma  NO Health care worker at home  [address] care worker  NO Is there a Pregnant Woman in the home  NO Dialysis pt in the home   NO a large number of people living in the home

## 2022-07-29 NOTE — H&P (VIEW-ONLY)
Patient in today for pre-procedure testing. States was in close contact with someone (+) COVID. NAAT testing completed. Mylene Coughlin is a 74 y.o. female presenting for/with:    No chief complaint on file.       Symptom review:    NO  Fever   NO  Shaking chills  NO  Cough  NO  Body aches  NO  Coughing up blood  NO  Chest congestion  NO  Chest pain  NO  Shortness of breath  NO  Profound Loss of smell/taste  NO  Nausea/Vomiting   NO  Loose stool/Diarrhea  NO  any skin issues    Patient Risk Factors Reviewed as follows:  NO  have you been in Close contact with confirmed COVID19 patient   NO  History of recent travel to affected geographical areas within the past 14 days  NO  COPD  NO  Active Cancer/Leukemia/Lymphoma/Chemotherapy  NO  Oral steroid use  NO  Pregnant  NO  Diabetes Mellitus  YES  Heart disease  NO  Asthma  NO Health care worker at home  [address] care worker  NO Is there a Pregnant Woman in the home  NO Dialysis pt in the home   NO a large number of people living in the home

## 2022-07-29 NOTE — PROGRESS NOTES
Patient in today for pre-procedure testing. States was in close contact with someone (+) COVID. NAAT testing completed. Miranda Orona is a 74 y.o. female presenting for/with:    No chief complaint on file.       Symptom review:    NO  Fever   NO  Shaking chills  NO  Cough  NO  Body aches  NO  Coughing up blood  NO  Chest congestion  NO  Chest pain  NO  Shortness of breath  NO  Profound Loss of smell/taste  NO  Nausea/Vomiting   NO  Loose stool/Diarrhea  NO  any skin issues    Patient Risk Factors Reviewed as follows:  NO  have you been in Close contact with confirmed COVID19 patient   NO  History of recent travel to affected geographical areas within the past 14 days  NO  COPD  NO  Active Cancer/Leukemia/Lymphoma/Chemotherapy  NO  Oral steroid use  NO  Pregnant  NO  Diabetes Mellitus  YES  Heart disease  NO  Asthma  NO Health care worker at home  [address] care worker  NO Is there a Pregnant Woman in the home  NO Dialysis pt in the home   NO a large number of people living in the home

## 2022-07-29 NOTE — TELEPHONE ENCOUNTER
Requests COVID report be sent to Dr Yoko Santoyo. Results faxed to [phone] and to providers inbox.

## 2022-07-29 NOTE — LETTER
Mason Mackenzie  [facility] [id number]  Dept: [phone]     7/29/2022    [facility]   [facility]       Dear Camila Johnson: Your results are as follows:    Negative for Covid-19. You may return to work at full duty if you have had no COVID-19 symptoms for the past three days.      Results for orders placed or performed in visit on 07/29/22   AMB POC COVID-19 COV   Result Value Ref Range    SARS-COV-2 RNA POC Negative Negative    LOT NUMBER SWAB [id number]     EXP DATE SWAB 12/31/2024     LOT NUMBER SOLUTION [id number]     EXP DATE SOLUTION 12/31/22          please call me if you have any questions: [phone]    Sincerely,    Karen Nurse

## 2022-07-29 NOTE — TELEPHONE ENCOUNTER
----- Message from Junior Barnett sent at 7/28/2022  3:47 PM EDT -----  Subject: Message to Provider    QUESTIONS  Information for Provider? Patient needs a call back to discuss getting   covid testing. She does not have symptoms but was exposed.  She is needing   this for a surgery on 08/02.  ---------------------------------------------------------------------------  --------------  Enedelia FRANCO  [phone]; OK to leave message on voicemail  ---------------------------------------------------------------------------  --------------  SCRIPT ANSWERS  undefined

## 2022-07-31 ENCOUNTER — ANESTHESIA EVENT (OUTPATIENT)
Dept: SURGERY | Age: 72
End: 2022-07-31
Payer: MEDICARE

## 2022-08-02 ENCOUNTER — ANESTHESIA EVENT (OUTPATIENT)
Dept: SURGERY | Age: 72
End: 2022-08-02
Payer: MEDICARE

## 2022-08-02 ENCOUNTER — ANESTHESIA (OUTPATIENT)
Dept: SURGERY | Age: 72
End: 2022-08-02
Payer: MEDICARE

## 2022-08-02 ENCOUNTER — HOSPITAL ENCOUNTER (OUTPATIENT)
Age: 72
Setting detail: OBSERVATION
Discharge: HOME | End: 2022-08-03
Attending: OBSTETRICS & GYNECOLOGY | Admitting: OBSTETRICS & GYNECOLOGY
Payer: MEDICARE

## 2022-08-02 DIAGNOSIS — N81.89 OTHER FEMALE GENITAL PROLAPSE: Primary | ICD-10-CM

## 2022-08-02 PROBLEM — N81.9 GENITAL PROLAPSE: Status: ACTIVE | Noted: 2022-08-02

## 2022-08-02 PROCEDURE — 74011250637 HC RX REV CODE- 250/637: Performed by: ANESTHESIOLOGY

## 2022-08-02 PROCEDURE — 88305 TISSUE EXAM BY PATHOLOGIST: CPT

## 2022-08-02 PROCEDURE — 77030021052 HC RNG RETRCTR STAY COOP -A: Performed by: OBSTETRICS & GYNECOLOGY

## 2022-08-02 PROCEDURE — 74011250637 HC RX REV CODE- 250/637: Performed by: OBSTETRICS & GYNECOLOGY

## 2022-08-02 PROCEDURE — 74011000250 HC RX REV CODE- 250: Performed by: NURSE ANESTHETIST, CERTIFIED REGISTERED

## 2022-08-02 PROCEDURE — G0378 HOSPITAL OBSERVATION PER HR: HCPCS

## 2022-08-02 PROCEDURE — 77030019927 HC TBNG IRR CYSTO BAXT -A: Performed by: OBSTETRICS & GYNECOLOGY

## 2022-08-02 PROCEDURE — 76060000035 HC ANESTHESIA 2 TO 2.5 HR: Performed by: OBSTETRICS & GYNECOLOGY

## 2022-08-02 PROCEDURE — 77030003029 HC SUT VCRL J&J -B: Performed by: OBSTETRICS & GYNECOLOGY

## 2022-08-02 PROCEDURE — 77030042556 HC PNCL CAUT -B: Performed by: OBSTETRICS & GYNECOLOGY

## 2022-08-02 PROCEDURE — 74011250636 HC RX REV CODE- 250/636: Performed by: OBSTETRICS & GYNECOLOGY

## 2022-08-02 PROCEDURE — 77030002924 HC SUT GORTX WLGO -B: Performed by: OBSTETRICS & GYNECOLOGY

## 2022-08-02 PROCEDURE — 74011000250 HC RX REV CODE- 250: Performed by: OBSTETRICS & GYNECOLOGY

## 2022-08-02 PROCEDURE — 74011250636 HC RX REV CODE- 250/636: Performed by: NURSE ANESTHETIST, CERTIFIED REGISTERED

## 2022-08-02 PROCEDURE — 2709999900 HC NON-CHARGEABLE SUPPLY: Performed by: OBSTETRICS & GYNECOLOGY

## 2022-08-02 PROCEDURE — 77030002966 HC SUT PDS J&J -A: Performed by: OBSTETRICS & GYNECOLOGY

## 2022-08-02 PROCEDURE — 77030040830 HC CATH URETH FOL MDII -A: Performed by: OBSTETRICS & GYNECOLOGY

## 2022-08-02 PROCEDURE — 77030040361 HC SLV COMPR DVT MDII -B: Performed by: OBSTETRICS & GYNECOLOGY

## 2022-08-02 PROCEDURE — 74011250636 HC RX REV CODE- 250/636: Performed by: ANESTHESIOLOGY

## 2022-08-02 PROCEDURE — 76210000016 HC OR PH I REC 1 TO 1.5 HR: Performed by: OBSTETRICS & GYNECOLOGY

## 2022-08-02 PROCEDURE — 76010000131 HC OR TIME 2 TO 2.5 HR: Performed by: OBSTETRICS & GYNECOLOGY

## 2022-08-02 PROCEDURE — 77030018789 HC NDL SUT ANCH -A: Performed by: OBSTETRICS & GYNECOLOGY

## 2022-08-02 PROCEDURE — 77030008684 HC TU ET CUF COVD -B: Performed by: ANESTHESIOLOGY

## 2022-08-02 PROCEDURE — 77030040922 HC BLNKT HYPOTHRM STRY -A

## 2022-08-02 PROCEDURE — 77030031139 HC SUT VCRL2 J&J -A: Performed by: OBSTETRICS & GYNECOLOGY

## 2022-08-02 RX ORDER — GLYCOPYRROLATE 0.2 MG/ML
INJECTION INTRAMUSCULAR; INTRAVENOUS AS NEEDED
Status: DISCONTINUED | OUTPATIENT
Start: 2022-08-02 | End: 2022-08-02 | Stop reason: HOSPADM

## 2022-08-02 RX ORDER — DIPHENHYDRAMINE HYDROCHLORIDE 50 MG/ML
12.5 INJECTION, SOLUTION INTRAMUSCULAR; INTRAVENOUS AS NEEDED
Status: DISCONTINUED | OUTPATIENT
Start: 2022-08-02 | End: 2022-08-02 | Stop reason: HOSPADM

## 2022-08-02 RX ORDER — SODIUM CHLORIDE 0.9 % (FLUSH) 0.9 %
5-40 SYRINGE (ML) INJECTION EVERY 8 HOURS
Status: DISCONTINUED | OUTPATIENT
Start: 2022-08-02 | End: 2022-08-03 | Stop reason: HOSPADM

## 2022-08-02 RX ORDER — LIDOCAINE HYDROCHLORIDE 10 MG/ML
0.1 INJECTION, SOLUTION EPIDURAL; INFILTRATION; INTRACAUDAL; PERINEURAL AS NEEDED
Status: DISCONTINUED | OUTPATIENT
Start: 2022-08-02 | End: 2022-08-02 | Stop reason: HOSPADM

## 2022-08-02 RX ORDER — KETAMINE HCL IN 0.9 % NACL 50 MG/5 ML
SYRINGE (ML) INTRAVENOUS AS NEEDED
Status: DISCONTINUED | OUTPATIENT
Start: 2022-08-02 | End: 2022-08-02 | Stop reason: HOSPADM

## 2022-08-02 RX ORDER — DEXAMETHASONE SODIUM PHOSPHATE 4 MG/ML
INJECTION, SOLUTION INTRA-ARTICULAR; INTRALESIONAL; INTRAMUSCULAR; INTRAVENOUS; SOFT TISSUE AS NEEDED
Status: DISCONTINUED | OUTPATIENT
Start: 2022-08-02 | End: 2022-08-02 | Stop reason: HOSPADM

## 2022-08-02 RX ORDER — SODIUM CHLORIDE, SODIUM LACTATE, POTASSIUM CHLORIDE, CALCIUM CHLORIDE 600; 310; 30; 20 MG/100ML; MG/100ML; MG/100ML; MG/100ML
125 INJECTION, SOLUTION INTRAVENOUS CONTINUOUS
Status: DISCONTINUED | OUTPATIENT
Start: 2022-08-02 | End: 2022-08-02 | Stop reason: HOSPADM

## 2022-08-02 RX ORDER — FENTANYL CITRATE 50 UG/ML
50 INJECTION, SOLUTION INTRAMUSCULAR; INTRAVENOUS AS NEEDED
Status: DISCONTINUED | OUTPATIENT
Start: 2022-08-02 | End: 2022-08-02 | Stop reason: HOSPADM

## 2022-08-02 RX ORDER — MIDAZOLAM HYDROCHLORIDE 1 MG/ML
0.5 INJECTION, SOLUTION INTRAMUSCULAR; INTRAVENOUS
Status: DISCONTINUED | OUTPATIENT
Start: 2022-08-02 | End: 2022-08-02 | Stop reason: HOSPADM

## 2022-08-02 RX ORDER — ACETAMINOPHEN 325 MG/1
650 TABLET ORAL ONCE
Status: COMPLETED | OUTPATIENT
Start: 2022-08-02 | End: 2022-08-02

## 2022-08-02 RX ORDER — HYDROMORPHONE HYDROCHLORIDE 2 MG/ML
INJECTION, SOLUTION INTRAMUSCULAR; INTRAVENOUS; SUBCUTANEOUS AS NEEDED
Status: DISCONTINUED | OUTPATIENT
Start: 2022-08-02 | End: 2022-08-02 | Stop reason: HOSPADM

## 2022-08-02 RX ORDER — DOCUSATE SODIUM 100 MG/1
100 CAPSULE, LIQUID FILLED ORAL 2 TIMES DAILY
Status: DISCONTINUED | OUTPATIENT
Start: 2022-08-02 | End: 2022-08-03 | Stop reason: HOSPADM

## 2022-08-02 RX ORDER — MORPHINE SULFATE 2 MG/ML
1 INJECTION, SOLUTION INTRAMUSCULAR; INTRAVENOUS
Status: DISCONTINUED | OUTPATIENT
Start: 2022-08-02 | End: 2022-08-03 | Stop reason: HOSPADM

## 2022-08-02 RX ORDER — PHENYLEPHRINE HCL IN 0.9% NACL 0.4MG/10ML
SYRINGE (ML) INTRAVENOUS AS NEEDED
Status: DISCONTINUED | OUTPATIENT
Start: 2022-08-02 | End: 2022-08-02 | Stop reason: HOSPADM

## 2022-08-02 RX ORDER — SODIUM CHLORIDE 0.9 % (FLUSH) 0.9 %
5-40 SYRINGE (ML) INJECTION AS NEEDED
Status: DISCONTINUED | OUTPATIENT
Start: 2022-08-02 | End: 2022-08-02 | Stop reason: HOSPADM

## 2022-08-02 RX ORDER — IBUPROFEN 600 MG/1
600 TABLET ORAL
Qty: 50 TABLET | Refills: 0 | Status: SHIPPED | OUTPATIENT
Start: 2022-08-02

## 2022-08-02 RX ORDER — FENTANYL CITRATE 50 UG/ML
25 INJECTION, SOLUTION INTRAMUSCULAR; INTRAVENOUS
Status: DISCONTINUED | OUTPATIENT
Start: 2022-08-02 | End: 2022-08-02 | Stop reason: HOSPADM

## 2022-08-02 RX ORDER — NALOXONE HYDROCHLORIDE 0.4 MG/ML
0.4 INJECTION, SOLUTION INTRAMUSCULAR; INTRAVENOUS; SUBCUTANEOUS AS NEEDED
Status: DISCONTINUED | OUTPATIENT
Start: 2022-08-02 | End: 2022-08-03 | Stop reason: HOSPADM

## 2022-08-02 RX ORDER — MORPHINE SULFATE 2 MG/ML
2 INJECTION, SOLUTION INTRAMUSCULAR; INTRAVENOUS
Status: DISCONTINUED | OUTPATIENT
Start: 2022-08-02 | End: 2022-08-02 | Stop reason: HOSPADM

## 2022-08-02 RX ORDER — DIPHENHYDRAMINE HYDROCHLORIDE 50 MG/ML
12.5 INJECTION, SOLUTION INTRAMUSCULAR; INTRAVENOUS
Status: DISCONTINUED | OUTPATIENT
Start: 2022-08-02 | End: 2022-08-03 | Stop reason: HOSPADM

## 2022-08-02 RX ORDER — FAMOTIDINE 20 MG/1
20 TABLET, FILM COATED ORAL EVERY 24 HOURS
Status: DISCONTINUED | OUTPATIENT
Start: 2022-08-02 | End: 2022-08-03 | Stop reason: HOSPADM

## 2022-08-02 RX ORDER — SODIUM CHLORIDE 9 MG/ML
1000 INJECTION, SOLUTION INTRAVENOUS CONTINUOUS
Status: DISCONTINUED | OUTPATIENT
Start: 2022-08-02 | End: 2022-08-02 | Stop reason: HOSPADM

## 2022-08-02 RX ORDER — SODIUM CHLORIDE 0.9 % (FLUSH) 0.9 %
5-40 SYRINGE (ML) INJECTION EVERY 8 HOURS
Status: DISCONTINUED | OUTPATIENT
Start: 2022-08-02 | End: 2022-08-02 | Stop reason: HOSPADM

## 2022-08-02 RX ORDER — SODIUM CHLORIDE 9 MG/ML
50 INJECTION, SOLUTION INTRAVENOUS CONTINUOUS
Status: DISCONTINUED | OUTPATIENT
Start: 2022-08-02 | End: 2022-08-02 | Stop reason: HOSPADM

## 2022-08-02 RX ORDER — IBUPROFEN 600 MG/1
600 TABLET ORAL
Status: DISCONTINUED | OUTPATIENT
Start: 2022-08-02 | End: 2022-08-03 | Stop reason: HOSPADM

## 2022-08-02 RX ORDER — BUPIVACAINE HYDROCHLORIDE AND EPINEPHRINE 2.5; 5 MG/ML; UG/ML
INJECTION, SOLUTION EPIDURAL; INFILTRATION; INTRACAUDAL; PERINEURAL AS NEEDED
Status: DISCONTINUED | OUTPATIENT
Start: 2022-08-02 | End: 2022-08-02 | Stop reason: HOSPADM

## 2022-08-02 RX ORDER — ROCURONIUM BROMIDE 10 MG/ML
INJECTION, SOLUTION INTRAVENOUS AS NEEDED
Status: DISCONTINUED | OUTPATIENT
Start: 2022-08-02 | End: 2022-08-02 | Stop reason: HOSPADM

## 2022-08-02 RX ORDER — MIDAZOLAM HYDROCHLORIDE 1 MG/ML
1 INJECTION, SOLUTION INTRAMUSCULAR; INTRAVENOUS AS NEEDED
Status: DISCONTINUED | OUTPATIENT
Start: 2022-08-02 | End: 2022-08-02 | Stop reason: HOSPADM

## 2022-08-02 RX ORDER — PROPOFOL 10 MG/ML
INJECTION, EMULSION INTRAVENOUS AS NEEDED
Status: DISCONTINUED | OUTPATIENT
Start: 2022-08-02 | End: 2022-08-02 | Stop reason: HOSPADM

## 2022-08-02 RX ORDER — SODIUM CHLORIDE 0.9 % (FLUSH) 0.9 %
5-40 SYRINGE (ML) INJECTION AS NEEDED
Status: DISCONTINUED | OUTPATIENT
Start: 2022-08-02 | End: 2022-08-03 | Stop reason: HOSPADM

## 2022-08-02 RX ORDER — KETOROLAC TROMETHAMINE 30 MG/ML
15 INJECTION, SOLUTION INTRAMUSCULAR; INTRAVENOUS
Status: DISCONTINUED | OUTPATIENT
Start: 2022-08-02 | End: 2022-08-03 | Stop reason: HOSPADM

## 2022-08-02 RX ORDER — ONDANSETRON 2 MG/ML
4 INJECTION INTRAMUSCULAR; INTRAVENOUS
Status: DISCONTINUED | OUTPATIENT
Start: 2022-08-02 | End: 2022-08-03 | Stop reason: HOSPADM

## 2022-08-02 RX ORDER — OXYCODONE AND ACETAMINOPHEN 5; 325 MG/1; MG/1
1 TABLET ORAL AS NEEDED
Status: DISCONTINUED | OUTPATIENT
Start: 2022-08-02 | End: 2022-08-02 | Stop reason: HOSPADM

## 2022-08-02 RX ORDER — SODIUM CHLORIDE, SODIUM LACTATE, POTASSIUM CHLORIDE, CALCIUM CHLORIDE 600; 310; 30; 20 MG/100ML; MG/100ML; MG/100ML; MG/100ML
100 INJECTION, SOLUTION INTRAVENOUS CONTINUOUS
Status: DISCONTINUED | OUTPATIENT
Start: 2022-08-02 | End: 2022-08-03 | Stop reason: HOSPADM

## 2022-08-02 RX ORDER — FENTANYL CITRATE 50 UG/ML
INJECTION, SOLUTION INTRAMUSCULAR; INTRAVENOUS AS NEEDED
Status: DISCONTINUED | OUTPATIENT
Start: 2022-08-02 | End: 2022-08-02 | Stop reason: HOSPADM

## 2022-08-02 RX ORDER — LIDOCAINE HYDROCHLORIDE 20 MG/ML
INJECTION, SOLUTION EPIDURAL; INFILTRATION; INTRACAUDAL; PERINEURAL AS NEEDED
Status: DISCONTINUED | OUTPATIENT
Start: 2022-08-02 | End: 2022-08-02 | Stop reason: HOSPADM

## 2022-08-02 RX ORDER — ONDANSETRON 2 MG/ML
4 INJECTION INTRAMUSCULAR; INTRAVENOUS AS NEEDED
Status: DISCONTINUED | OUTPATIENT
Start: 2022-08-02 | End: 2022-08-02 | Stop reason: HOSPADM

## 2022-08-02 RX ORDER — SODIUM CHLORIDE, SODIUM LACTATE, POTASSIUM CHLORIDE, CALCIUM CHLORIDE 600; 310; 30; 20 MG/100ML; MG/100ML; MG/100ML; MG/100ML
INJECTION, SOLUTION INTRAVENOUS
Status: DISCONTINUED | OUTPATIENT
Start: 2022-08-02 | End: 2022-08-02 | Stop reason: HOSPADM

## 2022-08-02 RX ORDER — HYDROMORPHONE HYDROCHLORIDE 1 MG/ML
0.2 INJECTION, SOLUTION INTRAMUSCULAR; INTRAVENOUS; SUBCUTANEOUS
Status: DISCONTINUED | OUTPATIENT
Start: 2022-08-02 | End: 2022-08-02 | Stop reason: HOSPADM

## 2022-08-02 RX ORDER — ONDANSETRON 2 MG/ML
INJECTION INTRAMUSCULAR; INTRAVENOUS AS NEEDED
Status: DISCONTINUED | OUTPATIENT
Start: 2022-08-02 | End: 2022-08-02 | Stop reason: HOSPADM

## 2022-08-02 RX ORDER — OXYCODONE AND ACETAMINOPHEN 5; 325 MG/1; MG/1
2 TABLET ORAL
Status: DISCONTINUED | OUTPATIENT
Start: 2022-08-02 | End: 2022-08-03 | Stop reason: HOSPADM

## 2022-08-02 RX ADMIN — FENTANYL CITRATE 50 MCG: 50 INJECTION, SOLUTION INTRAMUSCULAR; INTRAVENOUS at 07:26

## 2022-08-02 RX ADMIN — MORPHINE SULFATE 1 MG: 2 INJECTION, SOLUTION INTRAMUSCULAR; INTRAVENOUS at 11:57

## 2022-08-02 RX ADMIN — Medication 20 MG: at 07:26

## 2022-08-02 RX ADMIN — SODIUM CHLORIDE, POTASSIUM CHLORIDE, SODIUM LACTATE AND CALCIUM CHLORIDE 125 ML/HR: 600; 310; 30; 20 INJECTION, SOLUTION INTRAVENOUS at 09:57

## 2022-08-02 RX ADMIN — PROPOFOL 100 MG: 10 INJECTION, EMULSION INTRAVENOUS at 07:26

## 2022-08-02 RX ADMIN — ONDANSETRON HYDROCHLORIDE 4 MG: 2 INJECTION, SOLUTION INTRAMUSCULAR; INTRAVENOUS at 08:54

## 2022-08-02 RX ADMIN — WATER 2 G: 1 INJECTION INTRAMUSCULAR; INTRAVENOUS; SUBCUTANEOUS at 07:34

## 2022-08-02 RX ADMIN — Medication 40 MCG: at 08:59

## 2022-08-02 RX ADMIN — Medication 40 MCG: at 08:44

## 2022-08-02 RX ADMIN — LIDOCAINE HYDROCHLORIDE 100 MG: 20 INJECTION, SOLUTION EPIDURAL; INFILTRATION; INTRACAUDAL; PERINEURAL at 07:26

## 2022-08-02 RX ADMIN — KETOROLAC TROMETHAMINE 15 MG: 30 INJECTION, SOLUTION INTRAMUSCULAR; INTRAVENOUS at 11:47

## 2022-08-02 RX ADMIN — ACETAMINOPHEN 650 MG: 325 TABLET ORAL at 06:12

## 2022-08-02 RX ADMIN — SUGAMMADEX 120 MG: 100 INJECTION, SOLUTION INTRAVENOUS at 09:19

## 2022-08-02 RX ADMIN — DEXAMETHASONE SODIUM PHOSPHATE 8 MG: 4 INJECTION, SOLUTION INTRAMUSCULAR; INTRAVENOUS at 07:38

## 2022-08-02 RX ADMIN — SODIUM CHLORIDE, POTASSIUM CHLORIDE, SODIUM LACTATE AND CALCIUM CHLORIDE 100 ML/HR: 600; 310; 30; 20 INJECTION, SOLUTION INTRAVENOUS at 11:55

## 2022-08-02 RX ADMIN — Medication 40 MCG: at 07:37

## 2022-08-02 RX ADMIN — FENTANYL CITRATE 50 MCG: 50 INJECTION, SOLUTION INTRAMUSCULAR; INTRAVENOUS at 07:21

## 2022-08-02 RX ADMIN — ROCURONIUM BROMIDE 50 MG: 10 SOLUTION INTRAVENOUS at 07:26

## 2022-08-02 RX ADMIN — SODIUM CHLORIDE, POTASSIUM CHLORIDE, SODIUM LACTATE AND CALCIUM CHLORIDE: 600; 310; 30; 20 INJECTION, SOLUTION INTRAVENOUS at 07:21

## 2022-08-02 RX ADMIN — ONDANSETRON HYDROCHLORIDE 4 MG: 2 SOLUTION INTRAMUSCULAR; INTRAVENOUS at 21:07

## 2022-08-02 RX ADMIN — SODIUM CHLORIDE, POTASSIUM CHLORIDE, SODIUM LACTATE AND CALCIUM CHLORIDE: 600; 310; 30; 20 INJECTION, SOLUTION INTRAVENOUS at 08:47

## 2022-08-02 RX ADMIN — GLYCOPYRROLATE 0.2 MG: 0.2 INJECTION, SOLUTION INTRAMUSCULAR; INTRAVENOUS at 07:49

## 2022-08-02 RX ADMIN — FENTANYL CITRATE 25 MCG: 50 INJECTION, SOLUTION INTRAMUSCULAR; INTRAVENOUS at 10:20

## 2022-08-02 RX ADMIN — SODIUM CHLORIDE, POTASSIUM CHLORIDE, SODIUM LACTATE AND CALCIUM CHLORIDE 125 ML/HR: 600; 310; 30; 20 INJECTION, SOLUTION INTRAVENOUS at 06:19

## 2022-08-02 RX ADMIN — DOCUSATE SODIUM 100 MG: 100 CAPSULE, LIQUID FILLED ORAL at 17:00

## 2022-08-02 RX ADMIN — FAMOTIDINE 20 MG: 20 TABLET, FILM COATED ORAL at 20:54

## 2022-08-02 RX ADMIN — HYDROMORPHONE HYDROCHLORIDE 0.2 MG: 2 INJECTION, SOLUTION INTRAMUSCULAR; INTRAVENOUS; SUBCUTANEOUS at 08:12

## 2022-08-02 RX ADMIN — OXYCODONE AND ACETAMINOPHEN 2 TABLET: 5; 325 TABLET ORAL at 17:00

## 2022-08-02 RX ADMIN — Medication 40 MCG: at 08:51

## 2022-08-02 NOTE — OP NOTES
Operative Note    Patient: Marita Solis  YOB: 1950  MRN: [id number]    Date of Procedure: 8/2/2022     Pre-Op Diagnosis: PROLAPSE    Post-Op Diagnosis: Same as preoperative diagnosis. Procedure(s):  VAGINAL HYSTERECTOMY, POSSIBLE BILATERAL SALPINGO-OOPHORECTOMY, POSSIBLE UTEROSACRAL LIGAMENT SUSPENSION, ANTERIOR COLPORRHAPY, POSTERIOR COLPORRHAPY, CYSTOSCOPY    Surgeon(s):  Shay Diop MD    Surgical Assistant: Surg Asst-1: [facility] [place]    Anesthesia: General     Estimated Blood Loss (mL):  less than 50     Complications: None    Specimens:   ID Type Source Tests Collected by Time Destination   1 : uterus, cervix, Bilateral fallopian tubes and ovaries Fresh Uterus with Bilateral Fallopian tubes and Laurence Alexander MD 8/2/2022 0813 Pathology        Implants: * No implants in log *    Drains: * No LDAs found *    Findings: Stage 3 uterine prolapse, normal ovaries, normal postprocedure cystoscopy with B UO efflux and recta exam    Detailed Description of Procedure: Indications: Patient with symptomatic stage 3 pelvic organ prolapse and no stress urinary incontinence underwent the above procedures understanding the alternatives, purpose, risks, benefits, complications, and enedina-operative course. Disposition: stable, to PACU    Technique:  She was placed under general anesthesia without difficulty and placed in the high supine lithotomy position in candy-cane stirrups with careful attention to upper and lower extremity positioning to avoid joint, muscle or nerve injury. She was prepped and draped in the normal sterile fashion. After time-out was performed and clipping was performed, a Rolle catheter was placed in the sterile field. Two Davion clamps were placed on the cervix and with downward traction, I noted the posterior vaginal wall rectal reflection. With a digital rectal exam, I confirmed this.  I then identified the anterior reflection and demarcated my circumferential incision. 0.25% Marcaine was injected circumferentially and then I dissected both sharply with a blade and bluntly, and then using curved Colmenares scissors, I cut to the fascia. I made the posterior wall colpotomy sharply, and placed a weighted retractor. I then turned my attention to the anterior vaginal wall and with meticulous sharp dissection with Metzenbaum scissors and intermittent blunt dissection, I was able to push the bladder up and find the peritoneal fold. I entered into the veiscovaginal space without any bladder injury. A Joanie retractor was used for excellent bladder protection and elevation. No hematuria was noted at that time in the Rolle catheter. Bilateral ureters were palpated in the anterior vaginal wall laterally and well away from where I planned to take my uterosacral ligament clamp bites. I then used Chiquita clamps to come across the bilateral uterosacral ligaments and suture ligate them with a 0-vicryl suture. I hugged the cervix and the uterus and came across my broad ligament sequentially with the Chiquita clamp and eventually across the utero-ovarian ligament, which was doubly suture ligated with 2-0 Vicryl suture. I then identified the left ovary and tube. The IP ligament was clamped with a Chiquita clamp and doubly suture ligated with 0-vicryl suture. I repeated these steps for the right tube and ovary. The cervix, uterus, and bilateral tubes and ovaries were sent for permanent specimen to Pathology. At this point, I packed the bowel away with a moist laparotomy sponge and with elevation of the anterior vaginal wall and bladder, I was able to identify the bilateral uterosacral ligaments at the level of the ischial spine.  Using a long Allis clamp, I was able to tent them away from the pelvic sidewall and placed 2 PDS and Highland-alistair sutures on a CV-2 needle away from the pelvic sidewall at the level of the ischial spine both on the right and then on the left and held 4 anteriorly and 4 posteriorly. At this point, a 70 degree rigid cystoscopy revealed no urethral or bladder injury. Bilateral ureteral orrifice efflux was seen both on and off tension of the uterosacral sutures. The Rolle catheter was replaced. With a Lone Star retractor and a Scherbak retractor in place, I was able to have excellent visualization of the anterior vaginal wall, which was redundant with the cystocele. Therefore I made an inverted T incision after injecting 0.25% marcaine with epinephrine throughout the anterior vaginal wall and suburethral tissue and onto the bilateral pubic rami from above. I dissected off the underlying fibromuscular tissues from overlying epithelium both bluntly and sharply with my dissection onto the bilateral white lines. Using 3-0 PDS suture I was able to plicate in the midline in a two layer fashion to eliminate the cystocele defect from white line to white line and normalize the anterior vaginal wall anatomy. I then trimmed the excess vaginal epithelial skin from the anterior repair and closed this incision with a running 2-0 vicryl suture. At this point, I proceeded with the uterosacral suture placement and placed 4 posterior and 4 anterior  PDS and Max-jose sutures through the level of the apex with careful attention not to go through the epithelium given their permanent nature and then held these. I also performed Barriga angle sutures of 0-vicryl suture at both vaginal apex corners for hemostasis. The patient was then placed in steep Trendelenburg with the small bowel falling away, and I did my 4 sequential uterosacral pulley sutures with excellent elevation of the anterior and apical vaginal wall. A 70-degree rigid cystoscopy again revealed no bladder injury, no urethral injury and bilateral ureteral orifice efflux. The Rolle catheter was replaced.  The Max-Jose sutures were trimmed on the knot and the vaginal cuff was closed with interrupted 0 vicryl sutures with excellent hemostasis and oversewing of the Morris-alistair sutures. I then turned my attention to the posterior vaginal wall, which was redundant with a gaping introitus. I made a mary kay-shaped incision, demarcated by Allis clamps over the posterior vaginal wall after injecting with 0.25% Marcaine and de-epithelializing the area carefully without any underlying rectal injury. I then stopped any bleeding with cautery and assured hemostasis and then released my epithelial edges and dissected out slightly laterally and then plicated the midline with a series of 2-0 PDS sutures thus eliminating any enterocele or rectocele and making the vaginal caliber normal sized. The incision was closed with a running a 2-0 vicryl locked suture. Rectal exam was negative for any injury. Lap sponge and needle counts were correct x 2. A vaginal packing was placed. The patient tolerated the procedure well and was extubated without difficulty.      Electronically Signed by Salena Adams MD on 8/2/2022 at 9:34 AM

## 2022-08-02 NOTE — ANESTHESIA POSTPROCEDURE EVALUATION
Procedure(s):  VAGINAL HYSTERECTOMY, POSSIBLE BILATERAL SALPINGO-OOPHORECTOMY, POSSIBLE UTEROSACRAL LIGAMENT SUSPENSION, ANTERIOR COLPORRHAPY, POSTERIOR COLPORRHAPY, CYSTOSCOPY. general    Anesthesia Post Evaluation      Multimodal analgesia: multimodal analgesia used between 6 hours prior to anesthesia start to PACU discharge  Patient location during evaluation: PACU  Patient participation: complete - patient participated  Level of consciousness: awake  Pain score: 2  Pain management: adequate  Airway patency: patent  Anesthetic complications: no  Cardiovascular status: acceptable  Respiratory status: acceptable  Hydration status: acceptable  Comments: I have evaluated the patient and meets criteria for discharge from PACU. Fani Wetzel MD  Post anesthesia nausea and vomiting:  controlled  Final Post Anesthesia Temperature Assessment:  Normothermia (36.0-37.5 degrees C)      INITIAL Post-op Vital signs:   Vitals Value Taken Time   /70 08/02/22 1023   Temp 36.4 °C (97.5 °F) 08/02/22 1023   Pulse 42 08/02/22 1028   Resp 9 08/02/22 1028   SpO2 99 % 08/02/22 1028   Vitals shown include unvalidated device data.

## 2022-08-02 NOTE — PERIOP NOTES
TRANSFER - OUT REPORT:    Verbal report given to Danilohernandezpauline 10 on [facility]  being transferred to Room 321 for routine progression of care       Report consisted of patients Situation, Background, Assessment and   Recommendations(SBAR). Time Pre op antibiotic [id number]  Anesthesia Stop time: 7172  Rolle Present on Transfer to floor:yes  Order for Rolle on Chart:yes  Discharge Prescriptions with Chart:no    Information from the following report(s) Procedure Summary, Intake/Output, and MAR was reviewed with the receiving nurse. Opportunity for questions and clarification was provided. Is the patient on 02? NO       L/Min        Other     Is the patient on a monitor? NO    Is the nurse transporting with the patient? NO    Surgical Waiting Area notified of patient's transfer from PACU? YES      The following personal items collected during your admission accompanied patient upon transfer:   Dental Appliance: Dental Appliances: None  Vision: Visual Aid: Glasses (READERS)  Hearing Aid:    Jewelry: Jewelry: None  Clothing: Clothing: At bedside (Returned in PACU)  Other Valuables:  Other Valuables: None  Valuables sent to safe:

## 2022-08-02 NOTE — DISCHARGE INSTRUCTIONS
Post-Operative Care Instructions  following pelvic reconstruction surgery    [facility]  [phone]    For the next four weeks, these instructions should be followed as carefully as possible. Please contact my office if you have any questions and ask for my nurse. Please review and follow these instructions: In the first couple of days, take it easy but remain mildly active. Be aware that you will experience some pain and discomfort in the vagina and anal area. This is normal and will lessen within a few days. Week 1: For the first week at home you should perform NO household activities such as laundry, vacuuming, shopping, or gardening. DO NOT lift anything heavier than a one gallon milk jug. If possible avoid climbing stairs more than once per day. DO NOT use a tampon or insert anything in the vagina unless you were prescribed a vaginal estrogen cream. NO intercourse for at least six weeks. Take a recommended laxative to reduce the risk of straining with bowel movements. Please note that a normal bowel movement will not damage your repair. You may experience some vaginal bleeding. A small amount is normal. If your flow is heavier than a period, please call my office. Discharge may occur until the sutures dissolve in 4-6 weeks. Weeks 2: Gradually increase your physical activity, but specifically you should still not lift anything heavier than the milk jug. Walking is good for you-just no aerobics, jogging or impact exercises. You may begin to drive at this time if you feel your comfort and reaction time have returned. Make sure you have an appointment with me 4 weeks after your surgery. Weeks 4: Gradually resume normal activities. Sexual activity can be resumed after six weeks if you were cleared at your post-operative appointment. You may take a shower after 48 hours have passed. Do not clean the vagina with any soaps. Water is sufficient. Please avoid sitting in bath water until Dr. Jean Carlos Ordonez gives you the ok. Please call the office or seek immediate medical attention if you have vaginal bleeding greater than 1 pad an hour, malodorous vaginal discharge that looks like pus, chest pain or shortness of breath, uncontrollable nausea and vomiting, a fever greater than 100.5 degrees, or uncontrollable pain. Dont hesitate to call my office with any questions or concerns and ask for my nurse . A physician is on call 24 hours a day, seven days a week for any urgent issues. After business hours and on weekends and holidays, call [phone] and ask to have the doctor on call paged.

## 2022-08-02 NOTE — PROGRESS NOTES
TRANSFER - IN REPORT:    Verbal report received from Janes John (name) on Helen Echevarria  being received from PACU (unit) for routine post - op      Report consisted of patients Situation, Background, Assessment and   Recommendations(SBAR). Information from the following report(s) SBAR, Kardex, OR Summary, Intake/Output, MAR, and Recent Results was reviewed with the receiving nurse. Opportunity for questions and clarification was provided. Assessment completed upon patients arrival to unit and care assumed.

## 2022-08-02 NOTE — ANESTHESIA PREPROCEDURE EVALUATION
Relevant Problems   ENDOCRINE   (+) Hypothyroidism       Anesthetic History   No history of anesthetic complications            Review of Systems / Medical History  Patient summary reviewed, nursing notes reviewed and pertinent labs reviewed    Pulmonary  Within defined limits                 Neuro/Psych   Within defined limits           Cardiovascular  Within defined limits                     GI/Hepatic/Renal  Within defined limits              Endo/Other  Within defined limits    Hypothyroidism       Other Findings              Physical Exam    Airway  Mallampati: II  TM Distance: > 6 cm  Neck ROM: normal range of motion   Mouth opening: Normal     Cardiovascular  Regular rate and rhythm,  S1 and S2 normal,  no murmur, click, rub, or gallop             Dental  No notable dental hx       Pulmonary  Breath sounds clear to auscultation               Abdominal  GI exam deferred       Other Findings            Anesthetic Plan    ASA: 2  Anesthesia type: general          Induction: Intravenous  Anesthetic plan and risks discussed with: Patient

## 2022-08-02 NOTE — PERIOP NOTES
Notified Dr Yovani Baeza of heart rate of 44-46. Current blood pressure is 141/68   MAP 85    No orders at this time.

## 2022-08-02 NOTE — BRIEF OP NOTE
Brief Postoperative Note    Patient: Aneita Buerger  YOB: 1950  MRN: [id number]    Date of Procedure: 8/2/2022     Pre-Op Diagnosis: PROLAPSE    Post-Op Diagnosis: Same as preoperative diagnosis.       Procedure(s):  VAGINAL HYSTERECTOMY, POSSIBLE BILATERAL SALPINGO-OOPHORECTOMY, POSSIBLE UTEROSACRAL LIGAMENT SUSPENSION, ANTERIOR COLPORRHAPY, POSTERIOR COLPORRHAPY, CYSTOSCOPY    Surgeon(s):  Kylee Chauhan MD    Surgical Assistant: Surg Asst-1: Aracely Crowe    Anesthesia: General     Estimated Blood Loss (mL): less than 50     Complications: None    Specimens:   ID Type Source Tests Collected by Time Destination   1 : uterus, cervix, Bilateral fallopian tubes and ovaries Fresh Uterus with Bilateral Fallopian tubes and Mikey Gupta MD 8/2/2022 0813 Pathology        Implants: * No implants in log *    Drains: * No LDAs found *    Findings: Stage 3 uterine prolapse, normal ovaries, normal post procedure rectal exam and cystoscopy with B UO efflux    Electronically Signed by Lolly Hicks MD on 8/2/2022 at 9:33 AM

## 2022-08-02 NOTE — PROGRESS NOTES
Medicare Outpatient Observation Notice (MOON) provided to patient/representative with verbal explanation of the notice. Time allotted for questions regarding the notice. Patient /representative provided a completed copy of the MOON notice. Copy placed on bedside chart.   Lesley Arnett, Care Management Assistant

## 2022-08-03 VITALS
TEMPERATURE: 98.1 F | WEIGHT: 131 LBS | BODY MASS INDEX: 21.83 KG/M2 | RESPIRATION RATE: 16 BRPM | HEIGHT: 65 IN | SYSTOLIC BLOOD PRESSURE: 99 MMHG | DIASTOLIC BLOOD PRESSURE: 61 MMHG | OXYGEN SATURATION: 94 % | HEART RATE: 48 BPM

## 2022-08-03 LAB
ERYTHROCYTE [DISTWIDTH] IN BLOOD BY AUTOMATED COUNT: 13.4 % (ref 11.5–14.5)
HCT VFR BLD AUTO: 39.2 % (ref 35–47)
HGB BLD-MCNC: 13.1 G/DL (ref 11.5–16)
MCH RBC QN AUTO: 31.3 PG (ref 26–34)
MCHC RBC AUTO-ENTMCNC: 33.4 G/DL (ref 30–36.5)
MCV RBC AUTO: 93.8 FL (ref 80–99)
NRBC # BLD: 0 K/UL (ref 0–0.01)
NRBC BLD-RTO: 0 PER 100 WBC
PLATELET # BLD AUTO: 229 K/UL (ref 150–400)
PMV BLD AUTO: 10.6 FL (ref 8.9–12.9)
RBC # BLD AUTO: 4.18 M/UL (ref 3.8–5.2)
WBC # BLD AUTO: 16.6 K/UL (ref 3.6–11)

## 2022-08-03 PROCEDURE — 85027 COMPLETE CBC AUTOMATED: CPT

## 2022-08-03 PROCEDURE — 36415 COLL VENOUS BLD VENIPUNCTURE: CPT

## 2022-08-03 PROCEDURE — G0378 HOSPITAL OBSERVATION PER HR: HCPCS

## 2022-08-03 PROCEDURE — 74011250637 HC RX REV CODE- 250/637: Performed by: OBSTETRICS & GYNECOLOGY

## 2022-08-03 RX ORDER — HYDROCODONE BITARTRATE AND ACETAMINOPHEN 5; 325 MG/1; MG/1
1 TABLET ORAL
Qty: 18 TABLET | Refills: 0 | Status: SHIPPED | OUTPATIENT
Start: 2022-08-03 | End: 2022-08-06

## 2022-08-03 RX ADMIN — IBUPROFEN 600 MG: 600 TABLET ORAL at 06:49

## 2022-08-03 RX ADMIN — DOCUSATE SODIUM 100 MG: 100 CAPSULE, LIQUID FILLED ORAL at 09:21

## 2022-08-03 RX ADMIN — OXYCODONE AND ACETAMINOPHEN 2 TABLET: 5; 325 TABLET ORAL at 10:48

## 2022-08-03 NOTE — PROGRESS NOTES
Urogyn progress note-    S- flatus, could not void, minimal pain, no bleeding, no CP    O-  Visit Vitals  /63 (BP 1 Location: Right upper arm, BP Patient Position: At rest)   Pulse (!) 51   Temp 98.6 °F (37 °C)   Resp 16   Ht 5' 5\" (1.651 m)   Wt 59.4 kg (131 lb)   SpO2 96%   BMI 21.80 kg/m²       Intake/Output Summary (Last 24 hours) at 8/3/2022 0719  Last data filed at 8/3/2022 0520  Gross per 24 hour   Intake 1720 ml   Output 1750 ml   Net -30 ml     NAD  Abd- mild distension, soft, no rebound  No vaginal bleeding    A- POD 1 VH and suspension    P-  Ambulate  PO pain meds  Breakfast  Dispo home

## 2022-08-03 NOTE — PROGRESS NOTES
Famotidine dose decreased from 20 mg twice daily to 20 mg daily per renal dosing protocol for creatinine clearance <50ml/min.

## 2022-08-03 NOTE — PROGRESS NOTES
Bedside and Verbal shift change report given to 2825 Gordo Dixon (oncoming nurse) by Dc Duque RN (offgoing nurse). Report included the following information SBAR, Kardex, Intake/Output, MAR, and Recent Results. 0870: Started voiding trial.  Instilled 300 ml into bladder. Trial failed - voided 100 ml    0640: Reinserted brito catheter and educated patient on how to use brito plug at home.

## 2022-08-03 NOTE — PROGRESS NOTES
Bedside and Verbal shift change report given to Lana Carrizales RN  (oncoming nurse) by Ilana Woodward  (offgoing nurse). Report included the following information SBAR, Kardex, Intake/Output, MAR, and Recent Results. I have reviewed discharge instructions with the patient and spouse. The patient and spouse verbalized understanding. Pt given copy of discharge instructions and medication times reviewed. Pt given extra brito plugs. Pt able to demonstrate how to properly care for brito catheter.

## 2022-08-11 RX ORDER — PREDNISOLONE ACETATE 10 MG/ML
SUSPENSION/ DROPS OPHTHALMIC
COMMUNITY
Start: 2022-07-25

## 2022-08-11 RX ORDER — TOBRAMYCIN 3 MG/ML
SOLUTION/ DROPS OPHTHALMIC
COMMUNITY
Start: 2022-07-25

## 2022-08-11 NOTE — PERIOP NOTES
[address] [place] [address]  SURGICAL PRE-ADMISSION INSTRUCTIONS    ARRIVAL  You will be called the day before your surgery with your expected arrival time. Sign in at the  of the hospital.  You will be directed to the Surgical Waiting Room. Please arrive at your scheduled appointment time. You have been scheduled to arrive for your procedure one or two hours prior to the expected start time of your procedure. Every effort will be made to minimize your wait but please be aware that unforeseen circumstances may affect our schedule. EATING  DO NOT EAT OR DRINK ANYTHING AFTER MIDNIGHT ON THE EVENING BEFORE YOUR SURGERY OR ON THE DAY OF YOUR SURGERY except for your medications (as instructed) with a sip of water. Do not use gum, mints or lozenges on the morning of your surgery. Please do not smoke or chew tobacco before your surgery. MEDICATIONS   Take the following medications on the morning of your surgery with the smallest amount of water possible : Synthroid    STOP THESE MEDICATIONS AT THE TIMES LISTED BELOW  none     DRIVING/TRANSPORATION  Have a responsible adult to drive you home from the hospital and to stay with you over night. Please have them plan to remain in the hospital during your surgery. Your surgery will not be done if you do not have a responsible adult to take you home and to stay with you. If you have arranged for public transport, you must have a responsible adult to ride with you (who is not the ). You may not drive for 24 hours after anesthesia. PREPARATION  If you have a Living WiIl/Advance Directive, please bring a copy with you to scan into your chart. Please DO NOT wear makeup or nail polish  Please leave valuables at home,  DO NOT wear jewelry. Wear loose, comfortable clothing that is large enough to cover a bulky dressing.     SPECIAL INSTRUCTIONS:  none    Reviewed above preoperative instructions and answered questions by phone interview    Patient:  Eagle Mayberry   Date:     August 11, 2022  Time:   9:18 AM    RN:  Farzaneh Jones RN    Date:     August 11, 2022  Time:   9:18 AM

## 2022-08-16 PROBLEM — H25.11 AGE-RELATED NUCLEAR CATARACT, RIGHT EYE: Chronic | Status: ACTIVE | Noted: 2022-08-16

## 2022-08-16 PROBLEM — H25.11 AGE-RELATED NUCLEAR CATARACT, RIGHT EYE: Status: ACTIVE | Noted: 2022-08-16

## 2022-08-17 ENCOUNTER — ANESTHESIA (OUTPATIENT)
Dept: SURGERY | Age: 72
End: 2022-08-17
Payer: MEDICARE

## 2022-08-17 ENCOUNTER — HOSPITAL ENCOUNTER (OUTPATIENT)
Age: 72
Setting detail: OUTPATIENT SURGERY
Discharge: HOME | End: 2022-08-17
Attending: OPHTHALMOLOGY | Admitting: OPHTHALMOLOGY
Payer: MEDICARE

## 2022-08-17 VITALS
OXYGEN SATURATION: 100 % | RESPIRATION RATE: 16 BRPM | DIASTOLIC BLOOD PRESSURE: 65 MMHG | TEMPERATURE: 98.5 F | WEIGHT: 131 LBS | HEIGHT: 65 IN | HEART RATE: 49 BPM | SYSTOLIC BLOOD PRESSURE: 123 MMHG | BODY MASS INDEX: 21.83 KG/M2

## 2022-08-17 PROBLEM — H25.11 AGE-RELATED NUCLEAR CATARACT, RIGHT EYE: Chronic | Status: RESOLVED | Noted: 2022-08-16 | Resolved: 2022-08-17

## 2022-08-17 PROBLEM — H25.11 AGE-RELATED NUCLEAR CATARACT, RIGHT EYE: Status: RESOLVED | Noted: 2022-08-16 | Resolved: 2022-08-17

## 2022-08-17 PROCEDURE — V2632 POST CHMBR INTRAOCULAR LENS: HCPCS | Performed by: OPHTHALMOLOGY

## 2022-08-17 PROCEDURE — 77030013987 HC SLV OPTH IRR ALCN -B: Performed by: OPHTHALMOLOGY

## 2022-08-17 PROCEDURE — 74011250636 HC RX REV CODE- 250/636: Performed by: OPHTHALMOLOGY

## 2022-08-17 PROCEDURE — 77030020828: Performed by: OPHTHALMOLOGY

## 2022-08-17 PROCEDURE — 77030007855 HC KNF OPHTH IKNF ALCN -A: Performed by: OPHTHALMOLOGY

## 2022-08-17 PROCEDURE — 76060000031 HC ANESTHESIA FIRST 0.5 HR: Performed by: OPHTHALMOLOGY

## 2022-08-17 PROCEDURE — 77030018831 HC SOL IRR H20 BAXT -A: Performed by: OPHTHALMOLOGY

## 2022-08-17 PROCEDURE — 76010000154 HC OR TIME FIRST 0.5 HR: Performed by: OPHTHALMOLOGY

## 2022-08-17 PROCEDURE — 2709999900 HC NON-CHARGEABLE SUPPLY: Performed by: OPHTHALMOLOGY

## 2022-08-17 PROCEDURE — 77030013353: Performed by: OPHTHALMOLOGY

## 2022-08-17 PROCEDURE — 74011250636 HC RX REV CODE- 250/636: Performed by: ANESTHESIOLOGY

## 2022-08-17 PROCEDURE — 77030014067 HC TIP PHACO KLMN ALCN -B: Performed by: OPHTHALMOLOGY

## 2022-08-17 PROCEDURE — 76210000020 HC REC RM PH II FIRST 0.5 HR: Performed by: OPHTHALMOLOGY

## 2022-08-17 PROCEDURE — 77030035283: Performed by: OPHTHALMOLOGY

## 2022-08-17 PROCEDURE — 74011000250 HC RX REV CODE- 250: Performed by: OPHTHALMOLOGY

## 2022-08-17 DEVICE — LENS IOL POST 1-PC 6X13 17.0 -- ACRYSOF: Type: IMPLANTABLE DEVICE | Site: EYE | Status: FUNCTIONAL

## 2022-08-17 RX ORDER — TOBRAMYCIN AND DEXAMETHASONE 3; 1 MG/ML; MG/ML
1 SUSPENSION/ DROPS OPHTHALMIC AS NEEDED
Status: COMPLETED | OUTPATIENT
Start: 2022-08-17 | End: 2022-08-17

## 2022-08-17 RX ORDER — TROPICAMIDE 10 MG/ML
1 SOLUTION/ DROPS OPHTHALMIC
Status: COMPLETED | OUTPATIENT
Start: 2022-08-17 | End: 2022-08-17

## 2022-08-17 RX ORDER — LIDOCAINE HYDROCHLORIDE 20 MG/ML
5 INJECTION, SOLUTION EPIDURAL; INFILTRATION; INTRACAUDAL; PERINEURAL ONCE
Status: COMPLETED | OUTPATIENT
Start: 2022-08-17 | End: 2022-08-17

## 2022-08-17 RX ORDER — PHENYLEPHRINE HYDROCHLORIDE 100 MG/ML
1 SOLUTION/ DROPS OPHTHALMIC
Status: COMPLETED | OUTPATIENT
Start: 2022-08-17 | End: 2022-08-17

## 2022-08-17 RX ORDER — MIDAZOLAM HYDROCHLORIDE 1 MG/ML
INJECTION, SOLUTION INTRAMUSCULAR; INTRAVENOUS AS NEEDED
Status: DISCONTINUED | OUTPATIENT
Start: 2022-08-17 | End: 2022-08-17 | Stop reason: HOSPADM

## 2022-08-17 RX ORDER — KETOROLAC TROMETHAMINE 5 MG/ML
1 SOLUTION OPHTHALMIC
Status: COMPLETED | OUTPATIENT
Start: 2022-08-17 | End: 2022-08-17

## 2022-08-17 RX ORDER — TETRACAINE HYDROCHLORIDE 5 MG/ML
1 SOLUTION OPHTHALMIC AS NEEDED
Status: COMPLETED | OUTPATIENT
Start: 2022-08-17 | End: 2022-08-17

## 2022-08-17 RX ORDER — SODIUM CHLORIDE, SODIUM LACTATE, POTASSIUM CHLORIDE, CALCIUM CHLORIDE 600; 310; 30; 20 MG/100ML; MG/100ML; MG/100ML; MG/100ML
25 INJECTION, SOLUTION INTRAVENOUS CONTINUOUS
Status: DISCONTINUED | OUTPATIENT
Start: 2022-08-17 | End: 2022-08-17 | Stop reason: HOSPADM

## 2022-08-17 RX ORDER — TETRACAINE HYDROCHLORIDE 5 MG/ML
2 SOLUTION OPHTHALMIC AS NEEDED
Status: DISCONTINUED | OUTPATIENT
Start: 2022-08-17 | End: 2022-08-17 | Stop reason: HOSPADM

## 2022-08-17 RX ADMIN — TETRACAINE HYDROCHLORIDE 1 DROP: 5 SOLUTION OPHTHALMIC at 09:45

## 2022-08-17 RX ADMIN — LIDOCAINE HYDROCHLORIDE 2 DROP: 35 GEL OPHTHALMIC at 09:25

## 2022-08-17 RX ADMIN — TETRACAINE HYDROCHLORIDE 1 DROP: 5 SOLUTION OPHTHALMIC at 09:35

## 2022-08-17 RX ADMIN — TETRACAINE HYDROCHLORIDE 1 DROP: 5 SOLUTION OPHTHALMIC at 09:25

## 2022-08-17 RX ADMIN — LIDOCAINE HYDROCHLORIDE 2 DROP: 35 GEL OPHTHALMIC at 09:45

## 2022-08-17 RX ADMIN — TROPICAMIDE 1 DROP: 10 SOLUTION/ DROPS OPHTHALMIC at 09:25

## 2022-08-17 RX ADMIN — TROPICAMIDE 1 DROP: 10 SOLUTION/ DROPS OPHTHALMIC at 09:35

## 2022-08-17 RX ADMIN — LIDOCAINE HYDROCHLORIDE 2 DROP: 35 GEL OPHTHALMIC at 09:35

## 2022-08-17 RX ADMIN — PHENYLEPHRINE HYDROCHLORIDE 1 DROP: 100 SOLUTION/ DROPS OPHTHALMIC at 09:45

## 2022-08-17 RX ADMIN — KETOROLAC TROMETHAMINE 1 DROP: 0.5 SOLUTION OPHTHALMIC at 09:45

## 2022-08-17 RX ADMIN — KETOROLAC TROMETHAMINE 1 DROP: 0.5 SOLUTION OPHTHALMIC at 09:35

## 2022-08-17 RX ADMIN — TROPICAMIDE 1 DROP: 10 SOLUTION/ DROPS OPHTHALMIC at 09:45

## 2022-08-17 RX ADMIN — SODIUM CHLORIDE, POTASSIUM CHLORIDE, SODIUM LACTATE AND CALCIUM CHLORIDE 25 ML/HR: 600; 310; 30; 20 INJECTION, SOLUTION INTRAVENOUS at 10:00

## 2022-08-17 RX ADMIN — KETOROLAC TROMETHAMINE 1 DROP: 0.5 SOLUTION OPHTHALMIC at 09:25

## 2022-08-17 RX ADMIN — MIDAZOLAM 1 MG: 1 INJECTION INTRAMUSCULAR; INTRAVENOUS at 10:11

## 2022-08-17 RX ADMIN — PHENYLEPHRINE HYDROCHLORIDE 1 DROP: 100 SOLUTION/ DROPS OPHTHALMIC at 09:25

## 2022-08-17 RX ADMIN — PHENYLEPHRINE HYDROCHLORIDE 1 DROP: 100 SOLUTION/ DROPS OPHTHALMIC at 09:35

## 2022-08-17 RX ADMIN — MIDAZOLAM 1 MG: 1 INJECTION INTRAMUSCULAR; INTRAVENOUS at 10:19

## 2022-08-17 NOTE — DISCHARGE INSTRUCTIONS
[facility]      POST OPERATIVE INSTRUCTIONS AND INFORMATION         THE FIRST 24 HOURS AFTER SURGERY, YOU WILL BE ASKED TO REMAIN AT BEDREST WITH YOUR HEAD ELEVATED AT 39 DEGREES THIS CAN BE DONE BY SLEEPING ON THE PILLOWS OR IN A RECLINER. YOU CAN GET UP AS NECESSARY. IF AN EYE SHIELD IS PLACED, IT  MUST REMAIN FIRMLY IN PLACE FOR THE FIRST 24 HOURS. IT WILL BE REMOVED IN THE OFFICE ON THE DAY FOLLOWING SURGERY WHEN I EXAMINE YOUR EYE. IF YOU ARE DISCHARGED WITH SUN GLASSES, THEY ARE TO BE WORN UNTIL BEDTIME WHEN AN EYE SHIELD IS TO BE TAPED OVER THE OPERATED EYE FOR SLEEP. YOU WILL NEED TO BRING ALL EYE MEDICATIONS TO YOUR APPOINTMENT THE DAY AFTER SURGERY. 3.   YOUR POST OP VISIT SCHEDULE IS AS FOLLOWS:             * ONE DAY AFTER SURGERY             * ONE WEEK AFTER SURGERY             * SIX WEEKS AFTER SURGERY (SPECTACLE REFRACTION AND DILATED EXAMINATION)    IT IS IMPORTANT THAT YOU WEAR EYE PROTECTION AT ALL TIMES FOR THE      FIRST WEEK AFTER EACH CATARACT SURGERY. DURING THE DAY, YOU WILL NEED TO WEAR EITHER OUR CURRENT SPECTACLES WITH A PLAIN WINDOW GLASS LENS OR YOU MAY WISH TO PURCHASE A PAIR OF DRUG STORE BIFOCALS WITH A POWER OF +2.50 OR SO. WHEN OUTSIDE, YOU MUST WEAR THE SOLAR RAY THAT ARE PROVIDED FOR YOU. AT BEDTIME, YOU MUST WEAR THE EYE SHIELD THAT IS ALSO PROVIDED. AGAIN THIS IS FOR THE FIRST WEEK FOLLOWING YOUR SURGERY.     IMPORTANT DOS AND DONTS:             *AVOID CONSTIPATION             *DO NOT PARTICIPATE IN SPORTS OR STRENUOUS PHYSICAL ACTIVITY INCLUDING                         SEXUAL RELATIONS             *DO NOT DRIVE FOR ONE WEEK OFTER EACH CATARACT SURGERY             *AVOID POWDERS, SPRAYS, OR OTHER THINGS THAT MIGHT GET IN YOUR EYES             *DO NOT RUB YOUR EYE OR PUT ANY PRESSURE ON YOUR EYE    IF YOUR HAIR IS WASHED BY SOMEONE ELSE, HAVE THEM POSITION YOU SO THAT YOU LEAN YOUR HEAD BACKWARDS INTO THE SINK TO AVOID SOAPY WATER FROM GETTING IN YOUR EYES. YOU SHOULD ALSO WEAR YOUR EYE SHIELD TO PREVENT INJURY OR CONTAMINATION TO THE EYE. DO NOT HESITATE TO CALL OUR OFFICE IF YOU FEEL SOMETHING IS NOT RIGHT OR YOU HAVE ANY QUESTIONS, UNUSUAL SYMPTOMS, OR SUDDEN CHANGES IN YOUR VISION. OUR TELEPHONE NUMBERS:              [facility] OFFICE              [phone]              *[place] OFFICE         [phone]    THANK YOU FOR ENTRUSTING YOUR EYE CARE TO US.    10.  YOU HAVE RECEIVED SEDATION AS PART OF YOUR PROCEDURE. NO DRIVING OR OPERATING HEAVY MACHINERY FOR 24 HOURS. YOUR                       BALANCE AND JUDGEMENT MAY BE IMPAIRED. DROWSINESS MAY ALSO OCCUR.

## 2022-08-17 NOTE — INTERVAL H&P NOTE
Update History & Physical    The Patient's History and Physical of July 1, 2022 was reviewed with the patient and I examined the patient. There was no change. The surgical site was confirmed by the patient and me. Plan:  The risk, benefits, expected outcome, and alternative to the recommended procedure have been discussed with the patient. Patient understands and wants to proceed with the procedure.     Electronically signed by Nika Alarcon MD on 8/17/2022 at 9:53 AM

## 2022-08-17 NOTE — OP NOTES
[facility]  OPERATIVE REPORT    Name:  Leta Conti  MR#:  [id number]  :  1950  ACCOUNT #:  [de-identified]  DATE OF SERVICE:  2022    PREOPERATIVE DIAGNOSIS:  Age-related nuclear cataract, right eye. POSTOPERATIVE DIAGNOSIS:  Pseudophakia, right eye. PROCEDURE PERFORMED:  Phacoemulsification of cataract, right eye, with intraocular lens implant. SURGEON:  Tess Almaguer MD    ASSISTANT:  None. ANESTHESIA:  Topical 3.5% lidocaine gel, 0.5% tetracaine drops, IV sedation by Anesthesia, and 2% preservative-free intraocular lidocaine. COMPLICATIONS:  None. SPECIMENS REMOVED:  None. IMPLANTS:  IOL. ESTIMATED BLOOD LOSS:  None. INDICATIONS/HISTORY:  This 49-year-old white female noted painless progressively decreased visual acuity in both eyes secondary to cataract formation affecting distance and near vision, driving and reading, and not improved by refraction. She presents now for an elective extracapsular cataract extraction with lens implant in her right eye to improve vision and lifestyle. In addition to cataract problem, she has decreased thyroid, has had her gallbladder removed, and extraction of her wrist.  Her current medications include Synthroid and glucosamine. She has no known medicine allergies, and was cleared for Surgery by Tyler Alford MD.  Ocular examination at the time of admission reveals a best corrected visual acuity with glare of 20/60 in either eye and intraocular pressures of 12 mmHg, both eyes. Extraocular examination reveals full fields to confrontation and normal motility with acne rosacea, dermatochalasis, bilateral ptosis, and exophthalmos. Anterior segment shows normal conjunctiva with clear corneas, open angles, deep clear chambers; equal, round, and reactive pupils, and normal iris. She has 5+ nuclear sclerotic lens changes in both eyes with axial posterior subscapular cataract, both eyes.   Dilated fundus shows a 0.1 cup-to-disc ratio, normal blood vessels, and dull foveal reflexes. DESCRIPTION OF PROCEDURE:  The patient was taken to the main operating room after receiving pupillary dilation, application of Honan balloon and topical anesthesia in the preop area, placed in the supine position and given IV sedation by Anesthesia. The patient was prepped and draped in the standard fashion for intraocular microsurgery of the right eye with a temporal approach. A limbal paracentesis was made with a 15-degree blade and the anterior chamber filled with Viscoat. A mary kay keratome was used to enter the anterior chamber from the temporal limbus in a four-plane fashion creating a 3 mm self-sealing corneal tunnel incision. An anterior capsulorrhexis was performed with capsulorrhexis forceps followed by hydrodissection and hydrodelineation of the nucleus in the capsular bag with balanced salt solution achieving rotation. A CDE of 2.21, a phacoemulsification time of 22.0 seconds, at an average power of 3.9% was required to remove the nucleus in a phaco chop technique in burst mode at high vacuum using OZil technology followed by irrigation/aspiration of the remaining cortex and vacuum polishing of the posterior capsule. The capsular bag was then filled with Provisc and an Bennett AcrySof posterior chamber foldable acrylic intraocular lens, model SN60WF, power +17.0 diopters, was injected into the capsular bag using an Bennett cartridge and the lens centered. Irrigation/aspiration was used to remove the Provisc from the anterior chamber and capsular bag. The anterior chamber was filled with balanced salt solution and the incisions tested and found to be watertight without a suture. A collagen shield soaked in Tobradex ophthalmic drops and tetracaine drops was placed on the cornea followed by Pred-G ophthalmic ointment. The speculum and drape were then removed and an eye shield taped over the eye.   The patient tolerated the procedure well and left the operating room for the step-down unit under the care of Anesthesia, in a satisfactory postop condition, alert and awake. The patient is to be discharged home when released by Anesthesia, to remain at bed rest with head elevated for the next 24 hours, resume all customary preop diet and medications and take Tylenol as needed for discomfort. The patient has a followup appointment on 08/18/2022 at 2:45 p.m.       Hiral Flood MD      [id number]  D:  08/17/2022 10:49  T:  08/17/2022 14:23  JOB #:  [id number]

## 2022-08-17 NOTE — BRIEF OP NOTE
Brief Postoperative Note    Patient: Kim Ribeiro  YOB: 1950  MRN: [id number]    Date of Procedure: 8/17/2022     Pre-Op Diagnosis: AGE-RELATED NUCLEAR CATARACT RIGHT EYE    Post-Op Diagnosis:  pseudophakia right eye       Procedure(s):  RIGHT EYE EXTRACAPSULAR CATARACT REMOVAL WITH INSERTION OF INTRA OCULAR LENS IMPLANT (MAC WITH TOPICAL)    Surgeon(s):  Favio Redding MD    Surgical Assistant: None    Anesthesia: MAC     Estimated Blood Loss (mL): none    Complications: None    Specimens: * No specimens in log *     Implants:   Implant Name Type Inv.  Item Serial No.  Lot No. LRB No. Used Action   LENS INTOCU 6.0 TO 30.0 DIOPT 118.7 A CONSTANT 0DEG ANG - [id number] Intraocular Lens LENS INTOCU 6.0 TO 30.0 DIOPT 118.7 A CONSTANT 0DEG [facility] [id number] [employer]  Right 1 Implanted       Drains: * No LDAs found *    Findings: cataract    Electronically Signed by Amanda Williamson MD on 8/17/2022 at 10:41 AM

## 2022-08-17 NOTE — ANESTHESIA PREPROCEDURE EVALUATION
Relevant Problems   ENDOCRINE   (+) Hypothyroidism       Anesthetic History   No history of anesthetic complications            Review of Systems / Medical History  Patient summary reviewed and nursing notes reviewed    Pulmonary                   Neuro/Psych   Within defined limits           Cardiovascular              Hyperlipidemia         GI/Hepatic/Renal  Within defined limits              Endo/Other      Hypothyroidism       Other Findings              Physical Exam    Airway  Mallampati: I  TM Distance: > 6 cm  Neck ROM: normal range of motion   Mouth opening: Normal     Cardiovascular    Rhythm: regular  Rate: normal         Dental  No notable dental hx       Pulmonary  Breath sounds clear to auscultation               Abdominal  GI exam deferred       Other Findings            Anesthetic Plan    ASA: 2  Anesthesia type: MAC          Induction: Intravenous  Anesthetic plan and risks discussed with: Patient

## 2022-08-23 ENCOUNTER — ANESTHESIA EVENT (OUTPATIENT)
Dept: SURGERY | Age: 72
End: 2022-08-23
Payer: MEDICARE

## 2022-08-23 PROBLEM — H25.12 AGE-RELATED NUCLEAR CATARACT, LEFT EYE: Status: ACTIVE | Noted: 2022-08-23

## 2022-08-23 PROBLEM — H25.12 AGE-RELATED NUCLEAR CATARACT, LEFT EYE: Chronic | Status: ACTIVE | Noted: 2022-08-23

## 2022-08-24 ENCOUNTER — ANESTHESIA (OUTPATIENT)
Dept: SURGERY | Age: 72
End: 2022-08-24
Payer: MEDICARE

## 2022-08-24 ENCOUNTER — HOSPITAL ENCOUNTER (OUTPATIENT)
Age: 72
Setting detail: OUTPATIENT SURGERY
Discharge: HOME | End: 2022-08-24
Attending: OPHTHALMOLOGY | Admitting: OPHTHALMOLOGY
Payer: MEDICARE

## 2022-08-24 VITALS
RESPIRATION RATE: 16 BRPM | HEART RATE: 50 BPM | DIASTOLIC BLOOD PRESSURE: 66 MMHG | OXYGEN SATURATION: 100 % | TEMPERATURE: 97.8 F | SYSTOLIC BLOOD PRESSURE: 123 MMHG

## 2022-08-24 PROBLEM — H25.12 AGE-RELATED NUCLEAR CATARACT, LEFT EYE: Chronic | Status: RESOLVED | Noted: 2022-08-23 | Resolved: 2022-08-24

## 2022-08-24 PROBLEM — H25.12 AGE-RELATED NUCLEAR CATARACT, LEFT EYE: Status: RESOLVED | Noted: 2022-08-23 | Resolved: 2022-08-24

## 2022-08-24 PROCEDURE — 77030013353: Performed by: OPHTHALMOLOGY

## 2022-08-24 PROCEDURE — V2632 POST CHMBR INTRAOCULAR LENS: HCPCS | Performed by: OPHTHALMOLOGY

## 2022-08-24 PROCEDURE — 76010000154 HC OR TIME FIRST 0.5 HR: Performed by: OPHTHALMOLOGY

## 2022-08-24 PROCEDURE — 77030018831 HC SOL IRR H20 BAXT -A: Performed by: OPHTHALMOLOGY

## 2022-08-24 PROCEDURE — 2709999900 HC NON-CHARGEABLE SUPPLY: Performed by: OPHTHALMOLOGY

## 2022-08-24 PROCEDURE — 77030007855 HC KNF OPHTH IKNF ALCN -A: Performed by: OPHTHALMOLOGY

## 2022-08-24 PROCEDURE — 74011250636 HC RX REV CODE- 250/636: Performed by: ANESTHESIOLOGY

## 2022-08-24 PROCEDURE — 76060000031 HC ANESTHESIA FIRST 0.5 HR: Performed by: OPHTHALMOLOGY

## 2022-08-24 PROCEDURE — 77030014067 HC TIP PHACO KLMN ALCN -B: Performed by: OPHTHALMOLOGY

## 2022-08-24 PROCEDURE — 74011250636 HC RX REV CODE- 250/636: Performed by: OPHTHALMOLOGY

## 2022-08-24 PROCEDURE — 77030035283: Performed by: OPHTHALMOLOGY

## 2022-08-24 PROCEDURE — 76210000020 HC REC RM PH II FIRST 0.5 HR: Performed by: OPHTHALMOLOGY

## 2022-08-24 PROCEDURE — 77030020828: Performed by: OPHTHALMOLOGY

## 2022-08-24 PROCEDURE — 74011000250 HC RX REV CODE- 250: Performed by: OPHTHALMOLOGY

## 2022-08-24 PROCEDURE — 77030013987 HC SLV OPTH IRR ALCN -B: Performed by: OPHTHALMOLOGY

## 2022-08-24 DEVICE — LENS IOL POST 1-PC 6X13 17.0 -- ACRYSOF: Type: IMPLANTABLE DEVICE | Site: EYE | Status: FUNCTIONAL

## 2022-08-24 RX ORDER — TROPICAMIDE 10 MG/ML
1 SOLUTION/ DROPS OPHTHALMIC
Status: COMPLETED | OUTPATIENT
Start: 2022-08-24 | End: 2022-08-24

## 2022-08-24 RX ORDER — TETRACAINE HYDROCHLORIDE 5 MG/ML
1 SOLUTION OPHTHALMIC AS NEEDED
Status: COMPLETED | OUTPATIENT
Start: 2022-08-24 | End: 2022-08-24

## 2022-08-24 RX ORDER — DIPHENHYDRAMINE HYDROCHLORIDE 50 MG/ML
12.5 INJECTION, SOLUTION INTRAMUSCULAR; INTRAVENOUS
Status: DISCONTINUED | OUTPATIENT
Start: 2022-08-24 | End: 2022-08-24 | Stop reason: HOSPADM

## 2022-08-24 RX ORDER — TOBRAMYCIN AND DEXAMETHASONE 3; 1 MG/ML; MG/ML
1 SUSPENSION/ DROPS OPHTHALMIC AS NEEDED
Status: COMPLETED | OUTPATIENT
Start: 2022-08-24 | End: 2022-08-24

## 2022-08-24 RX ORDER — SODIUM CHLORIDE, SODIUM LACTATE, POTASSIUM CHLORIDE, CALCIUM CHLORIDE 600; 310; 30; 20 MG/100ML; MG/100ML; MG/100ML; MG/100ML
25 INJECTION, SOLUTION INTRAVENOUS CONTINUOUS
Status: DISCONTINUED | OUTPATIENT
Start: 2022-08-24 | End: 2022-08-24 | Stop reason: HOSPADM

## 2022-08-24 RX ORDER — KETOROLAC TROMETHAMINE 5 MG/ML
1 SOLUTION OPHTHALMIC
Status: COMPLETED | OUTPATIENT
Start: 2022-08-24 | End: 2022-08-24

## 2022-08-24 RX ORDER — TETRACAINE HYDROCHLORIDE 5 MG/ML
2 SOLUTION OPHTHALMIC AS NEEDED
Status: DISCONTINUED | OUTPATIENT
Start: 2022-08-24 | End: 2022-08-24 | Stop reason: HOSPADM

## 2022-08-24 RX ORDER — SODIUM CHLORIDE, SODIUM LACTATE, POTASSIUM CHLORIDE, CALCIUM CHLORIDE 600; 310; 30; 20 MG/100ML; MG/100ML; MG/100ML; MG/100ML
100 INJECTION, SOLUTION INTRAVENOUS CONTINUOUS
Status: DISCONTINUED | OUTPATIENT
Start: 2022-08-24 | End: 2022-08-24 | Stop reason: HOSPADM

## 2022-08-24 RX ORDER — PHENYLEPHRINE HYDROCHLORIDE 100 MG/ML
1 SOLUTION/ DROPS OPHTHALMIC
Status: COMPLETED | OUTPATIENT
Start: 2022-08-24 | End: 2022-08-24

## 2022-08-24 RX ORDER — MIDAZOLAM HYDROCHLORIDE 1 MG/ML
INJECTION, SOLUTION INTRAMUSCULAR; INTRAVENOUS AS NEEDED
Status: DISCONTINUED | OUTPATIENT
Start: 2022-08-24 | End: 2022-08-24 | Stop reason: HOSPADM

## 2022-08-24 RX ORDER — SODIUM CHLORIDE 0.9 % (FLUSH) 0.9 %
5-40 SYRINGE (ML) INJECTION EVERY 8 HOURS
Status: DISCONTINUED | OUTPATIENT
Start: 2022-08-24 | End: 2022-08-24 | Stop reason: HOSPADM

## 2022-08-24 RX ORDER — SODIUM CHLORIDE 0.9 % (FLUSH) 0.9 %
5-40 SYRINGE (ML) INJECTION AS NEEDED
Status: DISCONTINUED | OUTPATIENT
Start: 2022-08-24 | End: 2022-08-24 | Stop reason: HOSPADM

## 2022-08-24 RX ORDER — LIDOCAINE HYDROCHLORIDE 20 MG/ML
5 INJECTION, SOLUTION EPIDURAL; INFILTRATION; INTRACAUDAL; PERINEURAL ONCE
Status: COMPLETED | OUTPATIENT
Start: 2022-08-24 | End: 2022-08-24

## 2022-08-24 RX ADMIN — TETRACAINE HYDROCHLORIDE 1 DROP: 5 SOLUTION OPHTHALMIC at 08:48

## 2022-08-24 RX ADMIN — KETOROLAC TROMETHAMINE 1 DROP: 0.5 SOLUTION OPHTHALMIC at 08:48

## 2022-08-24 RX ADMIN — LIDOCAINE HYDROCHLORIDE 2 DROP: 35 GEL OPHTHALMIC at 08:58

## 2022-08-24 RX ADMIN — PHENYLEPHRINE HYDROCHLORIDE 1 DROP: 100 SOLUTION/ DROPS OPHTHALMIC at 08:58

## 2022-08-24 RX ADMIN — KETOROLAC TROMETHAMINE 1 DROP: 0.5 SOLUTION OPHTHALMIC at 08:58

## 2022-08-24 RX ADMIN — TROPICAMIDE 1 DROP: 10 SOLUTION/ DROPS OPHTHALMIC at 08:58

## 2022-08-24 RX ADMIN — TROPICAMIDE 1 DROP: 10 SOLUTION/ DROPS OPHTHALMIC at 08:48

## 2022-08-24 RX ADMIN — PHENYLEPHRINE HYDROCHLORIDE 1 DROP: 100 SOLUTION/ DROPS OPHTHALMIC at 08:48

## 2022-08-24 RX ADMIN — KETOROLAC TROMETHAMINE 1 DROP: 0.5 SOLUTION OPHTHALMIC at 08:40

## 2022-08-24 RX ADMIN — TROPICAMIDE 1 DROP: 10 SOLUTION/ DROPS OPHTHALMIC at 08:40

## 2022-08-24 RX ADMIN — LIDOCAINE HYDROCHLORIDE 2 DROP: 35 GEL OPHTHALMIC at 08:48

## 2022-08-24 RX ADMIN — TETRACAINE HYDROCHLORIDE 1 DROP: 5 SOLUTION OPHTHALMIC at 08:40

## 2022-08-24 RX ADMIN — TETRACAINE HYDROCHLORIDE 1 DROP: 5 SOLUTION OPHTHALMIC at 08:58

## 2022-08-24 RX ADMIN — PHENYLEPHRINE HYDROCHLORIDE 1 DROP: 100 SOLUTION/ DROPS OPHTHALMIC at 08:40

## 2022-08-24 RX ADMIN — LIDOCAINE HYDROCHLORIDE 2 DROP: 35 GEL OPHTHALMIC at 08:40

## 2022-08-24 RX ADMIN — SODIUM CHLORIDE, POTASSIUM CHLORIDE, SODIUM LACTATE AND CALCIUM CHLORIDE: 600; 310; 30; 20 INJECTION, SOLUTION INTRAVENOUS at 08:30

## 2022-08-24 RX ADMIN — MIDAZOLAM 2 MG: 1 INJECTION INTRAMUSCULAR; INTRAVENOUS at 09:13

## 2022-08-24 NOTE — BRIEF OP NOTE
Brief Postoperative Note    Patient: Mylene Coughlin  YOB: 1950  MRN: [id number]    Date of Procedure: 8/24/2022     Pre-Op Diagnosis: AGE-RELATED NUCLEAR CATARACT RIGHT EYE    Post-Op Diagnosis:  pseudophakia left eye       Procedure(s):  LEFT EYE EXTRACAPSULAR CATARACT REMOVAL WITH INSERTION OF INTRA OCULAR LENS IMPLANT (MAC WITH TOPICAL)    Surgeon(s):  Ghazal Escobar MD    Surgical Assistant: None    Anesthesia: MAC     Estimated Blood Loss (mL): none    Complications: None    Specimens: * No specimens in log *     Implants:   Implant Name Type Inv.  Item Serial No.  Lot No. LRB No. Used Action   LENS INTOCU 6.0 TO 30.0 DIOPT 118.7 A CONSTANT 0DEG ANG - [id number] Intraocular Lens LENS INTOCU 6.0 TO 30.0 DIOPT 118.7 A CONSTANT 0DEG ANG [id number] [employer]  Left 1 Implanted       Drains: * No LDAs found *    Findings: cataract    Electronically Signed by Efra Lazar MD on 8/24/2022 at 9:41 AM

## 2022-08-24 NOTE — ANESTHESIA PREPROCEDURE EVALUATION
Relevant Problems   ENDOCRINE   (+) Hypothyroidism       Anesthetic History        Pertinent negatives: No PONV       Review of Systems / Medical History  Patient summary reviewed, nursing notes reviewed and pertinent labs reviewed    Pulmonary              Pertinent negatives: No asthma, sleep apnea and smoker     Neuro/Psych           Pertinent negatives: No seizures and CVA   Cardiovascular                Pertinent negatives: No hypertension and past MI  Exercise tolerance: >4 METS     GI/Hepatic/Renal             Pertinent negatives: No GERD, liver disease and renal disease   Endo/Other      Hypothyroidism    Pertinent negatives: No obesity   Other Findings            Physical Exam    Airway  Mallampati: II  TM Distance: 4 - 6 cm  Neck ROM: normal range of motion   Mouth opening: Normal     Cardiovascular    Rhythm: regular  Rate: normal         Dental  No notable dental hx       Pulmonary  Breath sounds clear to auscultation               Abdominal  GI exam deferred       Other Findings            Anesthetic Plan    ASA: 2  Anesthesia type: MAC            Anesthetic plan and risks discussed with: Patient      Plan topical anesthesia by surgeon with monitored anesthesia care. Patient aware that they will be sedated and not under general anesthesia with the possibility of awareness intraoperatively. Patient understands and accepts risks and agrees with plan. All questions answered.

## 2022-08-24 NOTE — INTERVAL H&P NOTE
Update History & Physical    The Patient's History and Physical of July 0, 2022 was reviewed with the patient and I examined the patient. There was no change. The surgical site was confirmed by the patient and me. Plan:  The risk, benefits, expected outcome, and alternative to the recommended procedure have been discussed with the patient. Patient understands and wants to proceed with the procedure.     Electronically signed by Erin Hassan MD on 8/24/2022 at 8:56 AM no

## 2022-08-24 NOTE — DISCHARGE INSTRUCTIONS
[address] [place] [address] Rd  [phone]      ACTIVITY:    Please, Bed rest for the remainder fo the day. No bending, stooping, lifting or straining. Do not drive until Dr. Sanjiv Berry give permission. DIET:    Please resume your usual diet. MEDICATIONS:    Pain medication may be ordered for you by your physician. You may take a laxative if needed to avoid constipation. Take tylenol for discomfort. WOUND CARE:    Please leave patch over eye until scheduled office visit  Sleep on two pillows tonight    OTHER INSTRUCTIONS:    Call your doctor immediately for:  Excessive bleeding  Worsening pain  Temperature greater than 101 degrees  Sudden change in vision      I have ready these instructions and understand them    Patient: Diogo Serg  Date:     August 24, 2022 Time:   8:30 AM  RN:  Michele Wang RN  Date:     August 24, 2022 Time:   8:30 AM    [facility]      POST OPERATIVE INSTRUCTIONS AND INFORMATION         THE FIRST 24 HOURS AFTER SURGERY, YOU WILL BE ASKED TO REMAIN AT BEDREST WITH YOUR HEAD ELEVATED AT 39 DEGREES THIS CAN BE DONE BY SLEEPING ON THE PILLOWS OR IN A RECLINER. YOU CAN GET UP AS NECESSARY. IF AN EYE SHIELD IS PLACED, IT  MUST REMAIN FIRMLY IN PLACE FOR THE FIRST 24 HOURS. IT WILL BE REMOVED IN THE OFFICE ON THE DAY FOLLOWING SURGERY WHEN I EXAMINE YOUR EYE. IF YOU ARE DISCHARGED WITH SUN GLASSES, THEY ARE TO BE WORN UNTIL BEDTIME WHEN AN EYE SHIELD IS TO BE TAPED OVER THE OPERATED EYE FOR SLEEP. YOU WILL NEED TO BRING ALL EYE MEDICATIONS TO YOUR APPOINTMENT THE DAY AFTER SURGERY. 3.   YOUR POST OP VISIT SCHEDULE IS AS FOLLOWS:             * ONE DAY AFTER SURGERY             * ONE WEEK AFTER SURGERY             * SIX WEEKS AFTER SURGERY (SPECTACLE REFRACTION AND DILATED EXAMINATION)    IT IS IMPORTANT THAT YOU WEAR EYE PROTECTION AT ALL TIMES FOR THE      FIRST WEEK AFTER EACH CATARACT SURGERY.   DURING THE DAY, YOU WILL NEED TO WEAR EITHER OUR CURRENT SPECTACLES WITH A PLAIN WINDOW GLASS LENS OR YOU MAY WISH TO PURCHASE A PAIR OF DRUG STORE BIFOCALS WITH A POWER OF +2.50 OR SO. WHEN OUTSIDE, YOU MUST WEAR THE SOLAR RAY THAT ARE PROVIDED FOR YOU. AT BEDTIME, YOU MUST WEAR THE EYE SHIELD THAT IS ALSO PROVIDED. AGAIN THIS IS FOR THE FIRST WEEK FOLLOWING YOUR SURGERY. IMPORTANT DOS AND DONTS:             *AVOID CONSTIPATION             *DO NOT PARTICIPATE IN SPORTS OR STRENUOUS PHYSICAL ACTIVITY INCLUDING                         SEXUAL RELATIONS             *DO NOT DRIVE FOR ONE WEEK OFTER EACH CATARACT SURGERY             *AVOID POWDERS, SPRAYS, OR OTHER THINGS THAT MIGHT GET IN YOUR EYES             *DO NOT RUB YOUR EYE OR PUT ANY PRESSURE ON YOUR EYE    IF YOUR HAIR IS WASHED BY SOMEONE ELSE, HAVE THEM POSITION YOU SO THAT YOU LEAN YOUR HEAD BACKWARDS INTO THE SINK TO AVOID SOAPY WATER FROM GETTING IN YOUR EYES. YOU SHOULD ALSO WEAR YOUR EYE SHIELD TO PREVENT INJURY OR CONTAMINATION TO THE EYE. DO NOT HESITATE TO CALL OUR OFFICE IF YOU FEEL SOMETHING IS NOT RIGHT OR YOU HAVE ANY QUESTIONS, UNUSUAL SYMPTOMS, OR SUDDEN CHANGES IN YOUR VISION. OUR TELEPHONE NUMBERS:              [facility] OFFICE              [phone]              *[place] OFFICE         [phone]    THANK YOU FOR ENTRUSTING YOUR EYE CARE TO US.    10.  YOU HAVE RECEIVED SEDATION AS PART OF YOUR PROCEDURE. NO DRIVING OR OPERATING HEAVY MACHINERY FOR 24 HOURS. YOUR                       BALANCE AND JUDGEMENT MAY BE IMPAIRED. DROWSINESS MAY ALSO OCCUR.

## 2022-08-24 NOTE — ANESTHESIA POSTPROCEDURE EVALUATION
Post-Anesthesia Evaluation and Assessment    Cardiovascular Function/Vital Signs  Visit Vitals  /66   Pulse (!) 50   Temp 36.6 °C (97.8 °F)   Resp 16   SpO2 100%       Patient is status post Procedure(s) with comments:  LEFT EYE EXTRACAPSULAR CATARACT REMOVAL WITH INSERTION OF INTRA OCULAR LENS IMPLANT (MAC WITH TOPICAL) - Procedure #2  CDE: 2.29  Time: 16.8 sec  Power: 8%. Nausea/Vomiting: Controlled. Postoperative hydration reviewed and adequate. Pain:  Pain Scale 1: Numeric (0 - 10) (08/24/22 7351)  Pain Intensity 1: 0 (08/24/22 0326)   Managed. Neurological Status:   Neuro (WDL): Within Defined Limits (08/24/22 0827)   At baseline. Mental Status and Level of Consciousness: Arousable. Pulmonary Status:   O2 Device: CO2 nasal cannula (08/24/22 0940)   Adequate oxygenation and airway patent. Complications related to anesthesia: None    Post-anesthesia assessment completed. No concerns. Patient has met all discharge requirements.     Signed By: Db Rhodes MD    August 24, 2022

## 2022-08-24 NOTE — OP NOTES
[facility]  OPERATIVE REPORT    Name:  Callie Alves  MR#:  [id number]  :  1950  ACCOUNT #:  [de-identified]  DATE OF SERVICE:  2022    PREOPERATIVE DIAGNOSIS:  Age-related nuclear cataract, left eye. POSTOPERATIVE DIAGNOSIS:  Pseudophakia, left eye. PROCEDURE PERFORMED:  Phacoemulsification of cataract, left eye, with intraocular lens implant. SURGEON:  Shanita Leyva MD    ASSISTANT:  None. ANESTHESIA:  Topical 3.5% lidocaine gel, 0.5% tetracaine drops, IV sedation by Anesthesia, and 2% preservative-free intraocular lidocaine. COMPLICATIONS:  None. SPECIMENS REMOVED:  None. IMPLANTS:  IOL. ESTIMATED BLOOD LOSS:  None. INDICATIONS:  This 66-year-old white female noted painless progressive decreased visual acuity in both eyes secondary to cataract formation affecting distance and near vision, driving and reading, not improved by refraction. She underwent phacoemulsification of cataract with lens implant in the right eye on 2022 and presents now for an elective extracapsular cataract extraction with lens implant in the left eye to improve vision and lifestyle. In addition to cataract, she has decreased thyroid function. She had her gallbladder removed and extraction and fixation of her left wrist.  Her current medications include Synthroid and glucosamine. She has no known medicine allergy. She was cleared for surgery by Nilesh Aragon MD.  Ocular examination at the time of admission reveals uncorrected acuity of 20/30 in the right eye and best corrected visual acuity with glare of 20/60 in the left eye with intraocular pressures of 11 mmHg in both eyes. Extraocular examination reveals full fields to confrontation and normal motility with acne rosacea, dermatochalasis, bilateral ptosis and exophthalmos. Anterior segment shows normal conjunctiva with clear corneas and healed incisions in the right eye.   The anterior chamber angles are open, chambers are deep and clear with equal, round and reactive pupils with normal iris. She has a posterior chamber IOL with clear capsule in the right eye and +5 nuclear sclerotic lens and axial posterior subcapsular cataract in the left eye. Dilated fundus shows a 0.1 cup-to-disc ratio, normal blood vessels, and dull foveal reflexes. DESCRIPTION OF PROCEDURE:  The patient was taken to the main operating room after receiving pupillary dilation, application of Honan balloon and topical anesthesia in the preop area, placed in the supine position and given IV sedation by Anesthesia. The patient was prepped and draped in the standard fashion for intraocular microsurgery of the left eye with a temporal approach. A limbal paracentesis was made with a 15-degree blade and the anterior chamber filled with Viscoat. A mary kay keratome was used to enter the anterior chamber from the temporal limbus in a four-plane fashion creating a 3 mm self-sealing corneal tunnel incision. An anterior capsulorrhexis was performed with capsulorrhexis forceps followed by hydrodissection and hydrodelineation of the nucleus in the capsular bag with balanced salt solution achieving rotation. A CDE of 2.29,phacoemulsification time of 16.8 seconds, at an average power of 8% was required to remove the nucleus in a phaco chop technique in burst mode at high vacuum using OZil technology followed by irrigation/aspiration of the remaining cortex and vacuum polishing of the posterior capsule. The capsular bag was then filled with Provisc and an Bennett AcrySof posterior chamber foldable acrylic intraocular lens, model [id number], power +17.0 diopters, was injected into the capsular bag using an Bennett cartridge and the lens centered. Irrigation/aspiration was used to remove the Provisc from the anterior chamber and capsular bag.   The anterior chamber was filled with balanced salt solution and the incisions tested and found to be watertight without a suture. A collagen shield soaked in Tobradex ophthalmic drops and tetracaine drops was placed on the cornea followed by Pred-G ophthalmic ointment. The speculum and drape were then removed and an eye shield taped over the eye. The patient tolerated the procedure well and left the operating room for the step-down unit under the care of Anesthesia, in a satisfactory postop condition, alert and awake. The patient is to be discharged home when released by Anesthesia, to remain at bed rest with head elevated for the next 24 hours, resume all customary preop diet and medications and take Tylenol as needed for discomfort. The patient has a followup appointment in my office on 08/25/2022 at 2:45 p.m.       Hiral Flood MD      [id number]  D:  08/24/2022 9:48  T:  08/24/2022 11:04  JOB #:  [id number]

## 2022-09-09 NOTE — ANESTHESIA POSTPROCEDURE EVALUATION
Procedure(s):  RIGHT EYE EXTRACAPSULAR CATARACT REMOVAL WITH INSERTION OF INTRA OCULAR LENS IMPLANT (MAC WITH TOPICAL).     MAC    Anesthesia Post Evaluation      Multimodal analgesia: multimodal analgesia not used between 6 hours prior to anesthesia start to PACU discharge  Patient location during evaluation: bedside  Patient participation: complete - patient participated  Level of consciousness: awake and alert  Pain score: 0  Pain management: adequate  Airway patency: patent  Anesthetic complications: no  Cardiovascular status: acceptable  Hydration status: acceptable  Post anesthesia nausea and vomiting:  none  Final Post Anesthesia Temperature Assessment:  Normothermia (36.0-37.5 degrees C)      INITIAL Post-op Vital signs:   Vitals Value Taken Time   /65 08/17/22 1041   Temp 36.9 °C (98.5 °F) 08/17/22 1041   Pulse 49 08/17/22 1041   Resp 16 08/17/22 1041   SpO2 100 % 08/17/22 1041

## 2022-09-13 ENCOUNTER — HOSPITAL ENCOUNTER (OUTPATIENT)
Dept: MAMMOGRAPHY | Age: 72
Discharge: HOME | End: 2022-09-13
Attending: INTERNAL MEDICINE
Payer: MEDICARE

## 2022-09-13 DIAGNOSIS — Z12.31 VISIT FOR SCREENING MAMMOGRAM: ICD-10-CM

## 2022-09-13 PROCEDURE — 77063 BREAST TOMOSYNTHESIS BI: CPT

## 2023-01-01 DIAGNOSIS — R73.09 ELEVATED GLUCOSE: Primary | ICD-10-CM

## 2023-01-01 DIAGNOSIS — E78.2 MIXED HYPERLIPIDEMIA: ICD-10-CM

## 2023-01-01 DIAGNOSIS — E03.9 ACQUIRED HYPOTHYROIDISM: ICD-10-CM

## 2023-01-01 NOTE — PROGRESS NOTES
Ms. Dinesh Carpenter is a 74 y.o. female who is here for evaluation of   Chief Complaint   Patient presents with    Annual Wellness Visit    Immunization/Injection     Updated though VIIS    Thyroid Problem     Routine thyroid F/U. Marie Garrison ASSESSMENT AND PLAN:    1. Medicare annual wellness visit, subsequent  2. Mixed hyperlipidemia  3. Acquired hypothyroidism  4. Elevated glucose  Will contact by phone when labs are available    No orders of the defined types were placed in this encounter. SHAILESH Pittman is a 58-year-old woman who continues to work part-time in [place]. She arrives today for subsequent annual wellness visit. She takes thyroid replacement therapy. She denies symptoms of thyroid excess or deficit. She feels well. She and her  walk regularly. She has a history of a very modestly elevated blood sugar and A1c but is yet to reach the diabetic threshold. ROS:  Denies  fever, chills, cough, chest pain, SOB,  nausea, vomiting, or diarrhea. Denies wt loss, wt gain, hemoptysis, hematochezia or melena. Physical Examination:    Visit Vitals  /76 (BP 1 Location: Left upper arm, BP Patient Position: Sitting, BP Cuff Size: Adult long)   Pulse 67   Temp 97.8 °F (36.6 °C) (Oral)   Resp 18   Ht 5' 5\" (1.651 m)   Wt 129 lb 12.8 oz (58.9 kg)   SpO2 97%   BMI 21.60 kg/m²      General:  Alert, cooperative, no distress. Head:  Normocephalic, without obvious abnormality, atraumatic. Eyes:  Conjunctivae/corneas clear. Pupils equal, round, reactive to light. Extraocular movements intact. Lungs:   Clear to auscultation bilaterally. Chest wall:  No tenderness or deformity. Cardiac:  RRR   Abdomen:   Soft, non-tender. Bowel sounds normal. No masses. No organomegaly. Extremities: Extremities normal, atraumatic, no cyanosis or edema. Pulses: 2+ and symmetric all extremities. Skin: Skin color, texture, turgor normal. No rashes or lesions.    Lymph nodes: Cervical, supraclavicular, and axillary nodes normal.   Neurologic: CNII-XII intact. Normal strength, sensation, and reflexes throughout. On this date 01/05/2023 I have spent 30 minutes reviewing previous notes, test results and face to face with the patient discussing the diagnosis and importance of compliance with the treatment plan as well as documenting on the day of the visit.     Rah Menon MD FACP    (signed electronically) on 1/5/2023 at 8:51 AM

## 2023-01-04 ENCOUNTER — APPOINTMENT (OUTPATIENT)
Dept: FAMILY MEDICINE CLINIC | Age: 73
End: 2023-01-04

## 2023-01-04 DIAGNOSIS — E03.9 ACQUIRED HYPOTHYROIDISM: ICD-10-CM

## 2023-01-04 DIAGNOSIS — E78.2 MIXED HYPERLIPIDEMIA: ICD-10-CM

## 2023-01-04 DIAGNOSIS — R73.09 ELEVATED GLUCOSE: ICD-10-CM

## 2023-01-05 ENCOUNTER — OFFICE VISIT (OUTPATIENT)
Dept: FAMILY MEDICINE CLINIC | Age: 73
End: 2023-01-05
Payer: MEDICARE

## 2023-01-05 VITALS
TEMPERATURE: 97.8 F | WEIGHT: 129.8 LBS | BODY MASS INDEX: 21.63 KG/M2 | DIASTOLIC BLOOD PRESSURE: 76 MMHG | SYSTOLIC BLOOD PRESSURE: 114 MMHG | HEIGHT: 65 IN | RESPIRATION RATE: 18 BRPM | HEART RATE: 67 BPM | OXYGEN SATURATION: 97 %

## 2023-01-05 DIAGNOSIS — R73.09 ELEVATED GLUCOSE: ICD-10-CM

## 2023-01-05 DIAGNOSIS — E03.9 ACQUIRED HYPOTHYROIDISM: ICD-10-CM

## 2023-01-05 DIAGNOSIS — E78.2 MIXED HYPERLIPIDEMIA: ICD-10-CM

## 2023-01-05 DIAGNOSIS — Z00.00 MEDICARE ANNUAL WELLNESS VISIT, SUBSEQUENT: Primary | ICD-10-CM

## 2023-01-05 LAB
ALBUMIN SERPL-MCNC: 3.9 G/DL (ref 3.5–5)
ALBUMIN/GLOB SERPL: 1.3 {RATIO} (ref 1.1–2.2)
ALP SERPL-CCNC: 78 U/L (ref 45–117)
ALT SERPL-CCNC: 21 U/L (ref 12–78)
ANION GAP SERPL CALC-SCNC: 5 MMOL/L (ref 5–15)
AST SERPL-CCNC: 20 U/L (ref 15–37)
BASOPHILS # BLD: 0.1 K/UL (ref 0–0.1)
BASOPHILS NFR BLD: 3 % (ref 0–1)
BILIRUB SERPL-MCNC: 0.4 MG/DL (ref 0.2–1)
BUN SERPL-MCNC: 14 MG/DL (ref 6–20)
BUN/CREAT SERPL: 15 (ref 12–20)
CALCIUM SERPL-MCNC: 9.4 MG/DL (ref 8.5–10.1)
CHLORIDE SERPL-SCNC: 107 MMOL/L (ref 97–108)
CHOLEST SERPL-MCNC: 222 MG/DL
CO2 SERPL-SCNC: 29 MMOL/L (ref 21–32)
CREAT SERPL-MCNC: 0.94 MG/DL (ref 0.55–1.02)
DIFFERENTIAL METHOD BLD: ABNORMAL
EOSINOPHIL # BLD: 0.3 K/UL (ref 0–0.4)
EOSINOPHIL NFR BLD: 8 % (ref 0–7)
ERYTHROCYTE [DISTWIDTH] IN BLOOD BY AUTOMATED COUNT: 13.2 % (ref 11.5–14.5)
EST. AVERAGE GLUCOSE BLD GHB EST-MCNC: 117 MG/DL
GLOBULIN SER CALC-MCNC: 3.1 G/DL (ref 2–4)
GLUCOSE SERPL-MCNC: 90 MG/DL (ref 65–100)
HBA1C MFR BLD: 5.7 % (ref 4–5.6)
HCT VFR BLD AUTO: 42.9 % (ref 35–47)
HDLC SERPL-MCNC: 53 MG/DL
HDLC SERPL: 4.2 {RATIO} (ref 0–5)
HGB BLD-MCNC: 13.7 G/DL (ref 11.5–16)
IMM GRANULOCYTES # BLD AUTO: 0 K/UL
IMM GRANULOCYTES NFR BLD AUTO: 0 %
LDLC SERPL CALC-MCNC: 122.2 MG/DL (ref 0–100)
LYMPHOCYTES # BLD: 2.7 K/UL (ref 0.8–3.5)
LYMPHOCYTES NFR BLD: 64 % (ref 12–49)
MCH RBC QN AUTO: 30.2 PG (ref 26–34)
MCHC RBC AUTO-ENTMCNC: 31.9 G/DL (ref 30–36.5)
MCV RBC AUTO: 94.7 FL (ref 80–99)
MONOCYTES # BLD: 0.5 K/UL (ref 0–1)
MONOCYTES NFR BLD: 11 % (ref 5–13)
NEUTS BAND NFR BLD MANUAL: 1 % (ref 0–6)
NEUTS SEG # BLD: 0.6 K/UL (ref 1.8–8)
NEUTS SEG NFR BLD: 13 % (ref 32–75)
NRBC # BLD: 0 K/UL (ref 0–0.01)
NRBC BLD-RTO: 0 PER 100 WBC
PLATELET # BLD AUTO: 201 K/UL (ref 150–400)
PMV BLD AUTO: 11.6 FL (ref 8.9–12.9)
POTASSIUM SERPL-SCNC: 4.1 MMOL/L (ref 3.5–5.1)
PROT SERPL-MCNC: 7 G/DL (ref 6.4–8.2)
RBC # BLD AUTO: 4.53 M/UL (ref 3.8–5.2)
RBC MORPH BLD: ABNORMAL
SODIUM SERPL-SCNC: 141 MMOL/L (ref 136–145)
TRIGL SERPL-MCNC: 234 MG/DL (ref ?–150)
TSH SERPL DL<=0.05 MIU/L-ACNC: 0.45 UIU/ML (ref 0.36–3.74)
VLDLC SERPL CALC-MCNC: 46.8 MG/DL
WBC # BLD AUTO: 4.2 K/UL (ref 3.6–11)

## 2023-01-05 PROCEDURE — 1101F PT FALLS ASSESS-DOCD LE1/YR: CPT | Performed by: INTERNAL MEDICINE

## 2023-01-05 PROCEDURE — G8427 DOCREV CUR MEDS BY ELIG CLIN: HCPCS | Performed by: INTERNAL MEDICINE

## 2023-01-05 PROCEDURE — G8420 CALC BMI NORM PARAMETERS: HCPCS | Performed by: INTERNAL MEDICINE

## 2023-01-05 PROCEDURE — 1123F ACP DISCUSS/DSCN MKR DOCD: CPT | Performed by: INTERNAL MEDICINE

## 2023-01-05 PROCEDURE — 99213 OFFICE O/P EST LOW 20 MIN: CPT | Performed by: INTERNAL MEDICINE

## 2023-01-05 PROCEDURE — G8432 DEP SCR NOT DOC, RNG: HCPCS | Performed by: INTERNAL MEDICINE

## 2023-01-05 PROCEDURE — 1090F PRES/ABSN URINE INCON ASSESS: CPT | Performed by: INTERNAL MEDICINE

## 2023-01-05 PROCEDURE — G9899 SCRN MAM PERF RSLTS DOC: HCPCS | Performed by: INTERNAL MEDICINE

## 2023-01-05 PROCEDURE — G8536 NO DOC ELDER MAL SCRN: HCPCS | Performed by: INTERNAL MEDICINE

## 2023-01-05 PROCEDURE — G0439 PPPS, SUBSEQ VISIT: HCPCS | Performed by: INTERNAL MEDICINE

## 2023-01-05 PROCEDURE — 3017F COLORECTAL CA SCREEN DOC REV: CPT | Performed by: INTERNAL MEDICINE

## 2023-01-05 RX ORDER — LEVOTHYROXINE SODIUM 88 UG/1
88 TABLET ORAL
Qty: 90 TABLET | Refills: 4 | Status: SHIPPED | OUTPATIENT
Start: 2023-01-05

## 2023-01-05 NOTE — PATIENT INSTRUCTIONS
Medicare Wellness Visit, Female     The best way to live healthy is to have a lifestyle where you eat a well-balanced diet, exercise regularly, limit alcohol use, and quit all forms of tobacco/nicotine, if applicable. Regular preventive services are another way to keep healthy. Preventive services (vaccines, screening tests, monitoring & exams) can help personalize your care plan, which helps you manage your own care. Screening tests can find health problems at the earliest stages, when they are easiest to treat. [facility] follows the current, evidence-based guidelines published by the [facility] Kyrie Rizo ([facility] [[facility]]STF) when recommending preventive services for our patients. Because we follow these guidelines, sometimes recommendations change over time as research supports it. (For example, mammograms used to be recommended annually. Even though Medicare will still pay for an annual mammogram, the newer guidelines recommend a mammogram every two years for women of average risk). Of course, you and your doctor may decide to screen more often for some diseases, based on your risk and your co-morbidities (chronic disease you are already diagnosed with). Preventive services for you include:  - Medicare offers their members a free annual wellness visit, which is time for you and your primary care provider to discuss and plan for your preventive service needs.  Take advantage of this benefit every year!    -Over the age of 72 should receive the recommended pneumonia vaccines.    -All adults should have a flu vaccine yearly.  -All adults should have a tetanus vaccine every 10 years.   -Over the age 48 should receive the shingles vaccines.        -All adults should be screened once for Hepatitis C.  -All adults age 38-68 who are overweight should have a diabetes screening test once every three years.   -Other screening tests and preventive services for persons with diabetes include: an eye exam to screen for diabetic retinopathy, a kidney function test, a foot exam, and stricter control over your cholesterol.   -Cardiovascular screening for adults with routine risk involves an electrocardiogram (ECG) at intervals determined by your doctor.     -Colorectal cancer screenings should be done for adults age 39-70 with no increased risk factors for colorectal cancer. There are a number of acceptable methods of screening for this type of cancer. Each test has its own benefits and drawbacks. Discuss with your doctor what is most appropriate for you during your annual wellness visit. The different tests include: colonoscopy (considered the best screening method), a fecal occult blood test, a fecal DNA test, and sigmoidoscopy.    -Lung cancer screening is recommended annually with a low dose CT scan for adults between age 54 and 68, who have smoked at least 30 pack years (equivalent of 1 pack per day for 30 days), and who is a current smoker or quit less than 15 years ago.    -A bone mass density test is recommended when a woman turns 65 to screen for osteoporosis. This test is only recommended one time, as a screening. Some providers will use this same test as a disease monitoring tool if you already have osteoporosis. -Breast cancer screenings are recommended every other year for women of normal risk, age 54-69.    -Cervical cancer screenings for women over age 72 are only recommended with certain risk factors. Here is a list of your current Health Maintenance items (your personalized list of preventive services) with a due date: There are no preventive care reminders to display for this patient.

## 2023-01-05 NOTE — PROGRESS NOTES
Loree Gutiérrez is a 74 y.o. female presenting for/with:    Chief Complaint   Patient presents with    Annual Wellness Visit    Immunization/Injection     Updated though VIIS    Thyroid Problem     Routine thyroid F/U. Visit Vitals  /76 (BP 1 Location: Left upper arm, BP Patient Position: Sitting, BP Cuff Size: Adult long)   Pulse 67   Temp 97.8 °F (36.6 °C) (Oral)   Resp 18   Ht 5' 5\" (1.651 m)   Wt 129 lb 12.8 oz (58.9 kg)   SpO2 97%   BMI 21.60 kg/m²     Pain Scale: 0 - No pain/10  Pain Location:     1. \"Have you been to the ER, urgent care clinic since your last visit? Hospitalized since your last visit? \" No    2. \"Have you seen or consulted any other health care providers outside of the [address] [place] [address] since your last visit? \" No     3. For patients aged 39-70: Has the patient had a colonoscopy / FIT/ Cologuard? Yes - no Care Gap present      If the patient is female:    4. For patients aged 41-77: Has the patient had a mammogram within the past 2 years? Yes - no Care Gap present      5. For patients aged 21-65: Has the patient had a pap smear? NA - based on age or sex      Learning Assessment 1/5/2023   PRIMARY LEARNER Patient   PRIMARY LANGUAGE ENGLISH   LEARNER PREFERENCE PRIMARY VIDEOS     PICTURES   ANSWERED BY self   RELATIONSHIP SELF     Fall Risk Assessment, last 12 mths 7/1/2022   Able to walk? Yes   Fall in past 12 months? 0   Do you feel unsteady? 0   Are you worried about falling 0       3 most recent PHQ Screens 7/1/2022   Little interest or pleasure in doing things Not at all   Feeling down, depressed, irritable, or hopeless Not at all   Total Score PHQ 2 0     Abuse Screening Questionnaire 7/1/2022   Do you ever feel afraid of your partner? N   Are you in a relationship with someone who physically or mentally threatens you? N   Is it safe for you to go home?  Y       ADL Assessment 7/1/2022   Feeding yourself No Help Needed   Getting from bed to chair No Help Needed Getting dressed No Help Needed   Bathing or showering No Help Needed   Walk across the room (includes cane/walker) No Help Needed   Using the telphone No Help Needed   Taking your medications No Help Needed   Preparing meals No Help Needed   Managing money (expenses/bills) No Help Needed   Moderately strenuous housework (laundry) No Help Needed   Shopping for personal items (toiletries/medicines) No Help Needed   Shopping for groceries No Help Needed   Driving No Help Needed   Climbing a flight of stairs No Help Needed   Getting to places beyond walking distances No Help Needed      Advance Care Planning 1/5/2023   Patient's Healthcare Decision Maker is: Named in scanned ACP document   Confirm Advance Directive Yes, on file   Patient Would Like to Complete Advance Directive -   Does the patient have other document types -      This is the Subsequent Medicare Annual Wellness Exam, performed 12 months or more after the Initial AWV or the last Subsequent AWV    I have reviewed the patient's medical history in detail and updated the computerized patient record. Assessment/Plan   Education and counseling provided:  Are appropriate based on today's review and evaluation    1. Medicare annual wellness visit, subsequent  2. Mixed hyperlipidemia  3. Acquired hypothyroidism  4. Elevated glucose       Depression Risk Factor Screening     3 most recent PHQ Screens 7/1/2022   Little interest or pleasure in doing things Not at all   Feeling down, depressed, irritable, or hopeless Not at all   Total Score PHQ 2 0       Alcohol & Drug Abuse Risk Screen    Do you average more than 1 drink per night or more than 7 drinks a week:  No    On any one occasion in the past three months have you have had more than 3 drinks containing alcohol:  No          Functional Ability and Level of Safety    Hearing: Hearing is good. Activities of Daily Living: The home contains: no safety equipment.   Patient does total self care Ambulation: with no difficulty     Fall Risk:  Fall Risk Assessment, last 12 mths 7/1/2022   Able to walk? Yes   Fall in past 12 months? 0   Do you feel unsteady? 0   Are you worried about falling 0      Abuse Screen:  Patient is not abused       Cognitive Screening    Has your family/caregiver stated any concerns about your memory: no     Health Maintenance Due   There are no preventive care reminders to display for this patient. Patient Care Team   Patient Care Team:  Verena Marcus MD as PCP - General (Internal Medicine Physician)  Verena Marcus MD as PCP - [facility] Kamala Coffman Provider    History     Patient Active Problem List   Diagnosis Code    Hypothyroidism E03.9    Hyperlipidemia E78.5    Postmenopausal osteoporosis M81.0    Advance directive on file Z68.5    Other female genital prolapse N81.89    Genital prolapse N81.9    Incomplete uterovaginal prolapse N81.2    Midline cystocele N81.11    Urinary tract infectious disease N39.0     Past Medical History:   Diagnosis Date    Cataract     BILATERAL    Fracture of right wrist     Hyperlipidemia     Hypothyroidism     Menopause     Other female genital prolapse 8/2/2022    Postmenopausal osteoporosis     Prolapse of female bladder, acquired     uterine prolapse      Past Surgical History:   Procedure Laterality Date    HX APPENDECTOMY      5YO    HX BLADDER SUSPENSION      HX CHOLECYSTECTOMY      HX COLONOSCOPY      HX DILATION AND CURETTAGE      HX TONSIL AND ADENOIDECTOMY      5YO    HX WISDOM TEETH EXTRACTION       Current Outpatient Medications   Medication Sig Dispense Refill    ibuprofen (MOTRIN) 600 mg tablet Take 1 Tablet by mouth every six (6) hours as needed for Pain. 50 Tablet 0    cholecalciferol, vitamin D3, (VITAMIN D3 PO) Take 1 Capsule by mouth every morning. ascorbic acid (VITAMIN C PO) Take 1 Tablet by mouth every morning. MAGNESIUM PO Take 1 Tablet by mouth every morning.       levothyroxine (SYNTHROID) 88 mcg tablet Take 1 Tablet by mouth Daily (before breakfast). 90 Tablet 4    glucosamine/chondr ricardo A sod (glucosamine-chondroitin) 750-600 mg tab Take 1 Tablet by mouth every morning. States takes glucosamine approx 3 times weekly      prednisoLONE acetate (PRED FORTE) 1 % ophthalmic suspension INSTILL 1 DROP INTO EACH EYE 4 TIMES DAILY STARTING 08/14/22 (Patient not taking: Reported on 1/5/2023)      tobramycin (TOBREX) 0.3 % ophthalmic solution INSTILL 1 DROP INTO EACH EYE 4 TIMES DAILY STSRTING 08/14/22 (Patient not taking: Reported on 1/5/2023)       No Known Allergies    Family History   Problem Relation Age of Onset    Hypertension Mother     Dementia Mother     Cancer Father         liver    No Known Problems Sister     No Known Problems Brother     Anesth Problems Neg Hx      Social History     Tobacco Use    Smoking status: Never    Smokeless tobacco: Never   Substance Use Topics    Alcohol use:  Yes     Alcohol/week: 1.0 standard drink     Types: 1 Glasses of wine per week     Comment: Occ a glass of wine         Ester Payan

## 2023-04-14 ENCOUNTER — TELEPHONE (OUTPATIENT)
Dept: FAMILY MEDICINE CLINIC | Age: 73
End: 2023-04-14

## 2023-05-22 RX ORDER — MAGNESIUM CARB/ALUMINUM HYDROX 105-160MG
1 TABLET,CHEWABLE ORAL
COMMUNITY

## 2023-05-22 RX ORDER — IBUPROFEN 600 MG/1
600 TABLET ORAL EVERY 6 HOURS PRN
COMMUNITY
Start: 2022-08-02

## 2023-05-22 RX ORDER — LEVOTHYROXINE SODIUM 88 UG/1
88 TABLET ORAL
COMMUNITY
Start: 2023-01-05

## 2023-09-06 ENCOUNTER — TRANSCRIBE ORDERS (OUTPATIENT)
Facility: HOSPITAL | Age: 73
End: 2023-09-06

## 2023-09-06 DIAGNOSIS — Z12.31 SCREENING MAMMOGRAM FOR BREAST CANCER: Primary | ICD-10-CM

## 2023-09-12 ENCOUNTER — TELEPHONE (OUTPATIENT)
Age: 73
End: 2023-09-12

## 2023-09-12 NOTE — TELEPHONE ENCOUNTER
Pt would like an order placed for [facility]  Mammogram  pt is scheduled  for apt tomorrow, Wed., 09/13/2023. Pt is requesting a call to ensure that you have placed this order. [phone] contact # stephanie Lezama.     Thanks,

## 2023-09-14 ENCOUNTER — HOSPITAL ENCOUNTER (OUTPATIENT)
Facility: HOSPITAL | Age: 73
Discharge: HOME | End: 2023-09-14
Attending: INTERNAL MEDICINE
Payer: MEDICARE

## 2023-09-14 VITALS — WEIGHT: 129 LBS | BODY MASS INDEX: 21.47 KG/M2

## 2023-09-14 DIAGNOSIS — Z12.31 SCREENING MAMMOGRAM FOR BREAST CANCER: ICD-10-CM

## 2023-09-14 PROCEDURE — 77063 BREAST TOMOSYNTHESIS BI: CPT

## 2024-03-31 NOTE — PROGRESS NOTES
SAWV    Thyroid history    Cologuard? Vs colonscopy  a1cMedicare Annual Wellness Visit    Arlette Lemus is here for Medicare AWV, Skin Problem (Requests PCP look at the spot on her cheek), Thyroid Problem (Routine F/U), and Due for Labs (Due for routine labs)    Assessment & Plan   Medicare annual wellness visit, subsequent  Hypothyroidism, unspecified type  -     TSH; Future  Mixed hyperlipidemia  -     CBC with Auto Differential; Future  -     Comprehensive Metabolic Panel; Future  -     Lipid Panel; Future  Encounter for screening for colorectal malignant neoplasm  -     Cologuard (Fecal DNA Colorectal Cancer Screening)  Other abnormal glucose  -     Hemoglobin A1C; Future    Recommendations for Preventive Services Due: see orders and patient instructions/AVS.  Recommended screening schedule for the next 5-10 years is provided to the patient in written form: see Patient Instructions/AVS.     No follow-ups on file.     Subjective     Patient's complete Health Risk Assessment and screening values have been reviewed and are found in Flowsheets. The following problems were reviewed today and where indicated follow up appointments were made and/or referrals ordered.    No Positive Risk Factors identified today.                                Objective   Vitals:    04/01/24 0816   BP: 112/63   Site: Left Upper Arm   Position: Sitting   Cuff Size: Medium Adult   Pulse: 68   Resp: 17   SpO2: 98%   Weight: 58.9 kg (129 lb 12.8 oz)   Height: 1.651 m (5' 5\")      Body mass index is 21.6 kg/m².      1. Medicare annual wellness visit, subsequent  2. Hypothyroidism, unspecified type  - TSH; Future    3. Mixed hyperlipidemia  - CBC with Auto Differential; Future  - Comprehensive Metabolic Panel; Future  - Lipid Panel; Future    4. Encounter for screening for colorectal malignant neoplasm  - Cologuard (Fecal DNA Colorectal Cancer Screening)    5. Other abnormal glucose  - Hemoglobin A1C; Future    6.  Skin lesion:  needs

## 2024-04-01 ENCOUNTER — OFFICE VISIT (OUTPATIENT)
Age: 74
End: 2024-04-01
Payer: MEDICARE

## 2024-04-01 VITALS
OXYGEN SATURATION: 98 % | DIASTOLIC BLOOD PRESSURE: 63 MMHG | SYSTOLIC BLOOD PRESSURE: 112 MMHG | RESPIRATION RATE: 17 BRPM | BODY MASS INDEX: 21.63 KG/M2 | WEIGHT: 129.8 LBS | HEART RATE: 68 BPM | HEIGHT: 65 IN

## 2024-04-01 DIAGNOSIS — E78.2 MIXED HYPERLIPIDEMIA: ICD-10-CM

## 2024-04-01 DIAGNOSIS — Z12.11 ENCOUNTER FOR SCREENING FOR COLORECTAL MALIGNANT NEOPLASM: ICD-10-CM

## 2024-04-01 DIAGNOSIS — L98.9 SKIN LESION: ICD-10-CM

## 2024-04-01 DIAGNOSIS — Z00.00 MEDICARE ANNUAL WELLNESS VISIT, SUBSEQUENT: Primary | ICD-10-CM

## 2024-04-01 DIAGNOSIS — E03.9 HYPOTHYROIDISM, UNSPECIFIED TYPE: ICD-10-CM

## 2024-04-01 DIAGNOSIS — R73.09 OTHER ABNORMAL GLUCOSE: ICD-10-CM

## 2024-04-01 DIAGNOSIS — Z12.12 ENCOUNTER FOR SCREENING FOR COLORECTAL MALIGNANT NEOPLASM: ICD-10-CM

## 2024-04-01 PROCEDURE — G8427 DOCREV CUR MEDS BY ELIG CLIN: HCPCS | Performed by: INTERNAL MEDICINE

## 2024-04-01 PROCEDURE — G8399 PT W/DXA RESULTS DOCUMENT: HCPCS | Performed by: INTERNAL MEDICINE

## 2024-04-01 PROCEDURE — 1123F ACP DISCUSS/DSCN MKR DOCD: CPT | Performed by: INTERNAL MEDICINE

## 2024-04-01 PROCEDURE — 1036F TOBACCO NON-USER: CPT | Performed by: INTERNAL MEDICINE

## 2024-04-01 PROCEDURE — 3017F COLORECTAL CA SCREEN DOC REV: CPT | Performed by: INTERNAL MEDICINE

## 2024-04-01 PROCEDURE — 99214 OFFICE O/P EST MOD 30 MIN: CPT | Performed by: INTERNAL MEDICINE

## 2024-04-01 PROCEDURE — 1090F PRES/ABSN URINE INCON ASSESS: CPT | Performed by: INTERNAL MEDICINE

## 2024-04-01 PROCEDURE — G0439 PPPS, SUBSEQ VISIT: HCPCS | Performed by: INTERNAL MEDICINE

## 2024-04-01 PROCEDURE — G8420 CALC BMI NORM PARAMETERS: HCPCS | Performed by: INTERNAL MEDICINE

## 2024-04-01 SDOH — ECONOMIC STABILITY: FOOD INSECURITY: WITHIN THE PAST 12 MONTHS, YOU WORRIED THAT YOUR FOOD WOULD RUN OUT BEFORE YOU GOT MONEY TO BUY MORE.: NEVER TRUE

## 2024-04-01 SDOH — ECONOMIC STABILITY: HOUSING INSECURITY
IN THE LAST 12 MONTHS, WAS THERE A TIME WHEN YOU DID NOT HAVE A STEADY PLACE TO SLEEP OR SLEPT IN A SHELTER (INCLUDING NOW)?: NO

## 2024-04-01 SDOH — ECONOMIC STABILITY: FOOD INSECURITY: WITHIN THE PAST 12 MONTHS, THE FOOD YOU BOUGHT JUST DIDN'T LAST AND YOU DIDN'T HAVE MONEY TO GET MORE.: NEVER TRUE

## 2024-04-01 SDOH — ECONOMIC STABILITY: INCOME INSECURITY: HOW HARD IS IT FOR YOU TO PAY FOR THE VERY BASICS LIKE FOOD, HOUSING, MEDICAL CARE, AND HEATING?: NOT HARD AT ALL

## 2024-04-01 ASSESSMENT — LIFESTYLE VARIABLES
HAS A RELATIVE, FRIEND, DOCTOR, OR ANOTHER HEALTH PROFESSIONAL EXPRESSED CONCERN ABOUT YOUR DRINKING OR SUGGESTED YOU CUT DOWN: NO
HOW OFTEN DURING THE LAST YEAR HAVE YOU FAILED TO DO WHAT WAS NORMALLY EXPECTED FROM YOU BECAUSE OF DRINKING: NEVER
HAVE YOU OR SOMEONE ELSE BEEN INJURED AS A RESULT OF YOUR DRINKING: NO
HOW OFTEN DURING THE LAST YEAR HAVE YOU FOUND THAT YOU WERE NOT ABLE TO STOP DRINKING ONCE YOU HAD STARTED: NEVER
HOW OFTEN DO YOU HAVE A DRINK CONTAINING ALCOHOL: 4 OR MORE TIMES A WEEK
HOW OFTEN DURING THE LAST YEAR HAVE YOU NEEDED AN ALCOHOLIC DRINK FIRST THING IN THE MORNING TO GET YOURSELF GOING AFTER A NIGHT OF HEAVY DRINKING: NEVER
HOW OFTEN DURING THE LAST YEAR HAVE YOU BEEN UNABLE TO REMEMBER WHAT HAPPENED THE NIGHT BEFORE BECAUSE YOU HAD BEEN DRINKING: NEVER
HOW MANY STANDARD DRINKS CONTAINING ALCOHOL DO YOU HAVE ON A TYPICAL DAY: 1 OR 2
HOW OFTEN DURING THE LAST YEAR HAVE YOU HAD A FEELING OF GUILT OR REMORSE AFTER DRINKING: NEVER

## 2024-04-01 ASSESSMENT — PATIENT HEALTH QUESTIONNAIRE - PHQ9
SUM OF ALL RESPONSES TO PHQ9 QUESTIONS 1 & 2: 0
SUM OF ALL RESPONSES TO PHQ QUESTIONS 1-9: 0
1. LITTLE INTEREST OR PLEASURE IN DOING THINGS: NOT AT ALL
2. FEELING DOWN, DEPRESSED OR HOPELESS: NOT AT ALL
SUM OF ALL RESPONSES TO PHQ QUESTIONS 1-9: 0

## 2024-04-01 NOTE — PROGRESS NOTES
Arlette Lemus is a 73 y.o. female presenting for/with:    Chief Complaint   Patient presents with    Medicare AWV    Skin Problem     Requests PCP look at the spot on her cheek    Thyroid Problem     Routine F/U    Due for Labs     Due for routine labs       Vitals:    04/01/24 0816   BP: 112/63   Site: Left Upper Arm   Position: Sitting   Cuff Size: Medium Adult   Pulse: 68   Resp: 17   SpO2: 98%   Weight: 58.9 kg (129 lb 12.8 oz)   Height: 1.651 m (5' 5\")       Pain Scale: 0 - No pain/10  Pain Location:     \"Have you been to the ER, urgent care clinic since your last visit?  Hospitalized since your last visit?\"    NO    “Have you seen or consulted any other health care providers outside of [facility] since your last visit?”    NO                 4/1/2024     8:13 AM   PHQ-9    Little interest or pleasure in doing things 0   Feeling down, depressed, or hopeless 0   PHQ-2 Score 0   PHQ-9 Total Score 0           1/5/2023    12:00 AM   [facility] AMB LEARNING ASSESSMENT   Primary Learner Patient   Primary Language ENGLISH   Learning Preference VIDEOS    PICTURES   Answered By self   Relationship to Learner SELF            4/1/2024     8:11 AM   Amb Fall Risk Assessment and TUG Test   Do you feel unsteady or are you worried about falling?  no   2 or more falls in past year? no   Fall with injury in past year? no           4/1/2024     8:00 AM   ADL ASSESSMENT   Feeding yourself No Help Needed   Getting from bed to chair No Help Needed   Getting dressed No Help Needed   Bathing or showering No Help Needed   Walk across the room (includes cane/walker) No Help Needed   Using the telphone No Help Needed   Taking your medications No Help Needed   Preparing meals No Help Needed   Managing money (expenses/bills) No Help Needed   Moderately strenuous housework (laundry) No Help Needed   Shopping for personal items (toiletries/medicines) No Help Needed   Shopping for groceries No Help Needed   Driving No Help

## 2024-04-01 NOTE — PATIENT INSTRUCTIONS
recommended every 1-2 years to screen for glaucoma; cataracts, macular degeneration, and other eye disorders.  A preventive dental visit is recommended every 6 months.  Try to get at least 150 minutes of exercise per week or 10,000 steps per day on a pedometer .  Order or download the FREE \"Exercise & Physical Activity: Your Everyday Guide\" from [employer]. Call 1-886.403.1507 or search [employer] online.  You need 2894-8662 mg of calcium and 4899-9704 IU of vitamin D per day. It is possible to meet your calcium requirement with diet alone, but a vitamin D supplement is usually necessary to meet this goal.  When exposed to the sun, use a sunscreen that protects against both UVA and UVB radiation with an SPF of 30 or greater. Reapply every 2 to 3 hours or after sweating, drying off with a towel, or swimming.  Always wear a seat belt when traveling in a car. Always wear a helmet when riding a bicycle or motorcycle.

## 2024-04-02 LAB
ALBUMIN SERPL-MCNC: 4.2 G/DL (ref 3.5–5)
ALBUMIN/GLOB SERPL: 1.3 (ref 1.1–2.2)
ALP SERPL-CCNC: 76 U/L (ref 45–117)
ALT SERPL-CCNC: 22 U/L (ref 12–78)
ANION GAP SERPL CALC-SCNC: 3 MMOL/L (ref 5–15)
AST SERPL-CCNC: 19 U/L (ref 15–37)
BASOPHILS # BLD: 0.1 K/UL (ref 0–0.1)
BASOPHILS NFR BLD: 1 % (ref 0–1)
BILIRUB SERPL-MCNC: 0.5 MG/DL (ref 0.2–1)
BUN SERPL-MCNC: 21 MG/DL (ref 6–20)
BUN/CREAT SERPL: 25 (ref 12–20)
CALCIUM SERPL-MCNC: 9.4 MG/DL (ref 8.5–10.1)
CHLORIDE SERPL-SCNC: 108 MMOL/L (ref 97–108)
CHOLEST SERPL-MCNC: 240 MG/DL
CO2 SERPL-SCNC: 31 MMOL/L (ref 21–32)
CREAT SERPL-MCNC: 0.85 MG/DL (ref 0.55–1.02)
DIFFERENTIAL METHOD BLD: NORMAL
EOSINOPHIL # BLD: 0.1 K/UL (ref 0–0.4)
EOSINOPHIL NFR BLD: 2 % (ref 0–7)
ERYTHROCYTE [DISTWIDTH] IN BLOOD BY AUTOMATED COUNT: 13.6 % (ref 11.5–14.5)
EST. AVERAGE GLUCOSE BLD GHB EST-MCNC: 114 MG/DL
GLOBULIN SER CALC-MCNC: 3.3 G/DL (ref 2–4)
GLUCOSE SERPL-MCNC: 102 MG/DL (ref 65–100)
HBA1C MFR BLD: 5.6 % (ref 4–5.6)
HCT VFR BLD AUTO: 46.1 % (ref 35–47)
HDLC SERPL-MCNC: 52 MG/DL
HDLC SERPL: 4.6 (ref 0–5)
HGB BLD-MCNC: 15.1 G/DL (ref 11.5–16)
IMM GRANULOCYTES # BLD AUTO: 0 K/UL (ref 0–0.04)
IMM GRANULOCYTES NFR BLD AUTO: 0 % (ref 0–0.5)
LDLC SERPL CALC-MCNC: 153.4 MG/DL (ref 0–100)
LYMPHOCYTES # BLD: 1.3 K/UL (ref 0.8–3.5)
LYMPHOCYTES NFR BLD: 20 % (ref 12–49)
MCH RBC QN AUTO: 30.7 PG (ref 26–34)
MCHC RBC AUTO-ENTMCNC: 32.8 G/DL (ref 30–36.5)
MCV RBC AUTO: 93.7 FL (ref 80–99)
MONOCYTES # BLD: 0.4 K/UL (ref 0–1)
MONOCYTES NFR BLD: 6 % (ref 5–13)
NEUTS SEG # BLD: 4.4 K/UL (ref 1.8–8)
NEUTS SEG NFR BLD: 71 % (ref 32–75)
NRBC # BLD: 0 K/UL (ref 0–0.01)
NRBC BLD-RTO: 0 PER 100 WBC
PLATELET # BLD AUTO: 262 K/UL (ref 150–400)
PMV BLD AUTO: 11.1 FL (ref 8.9–12.9)
POTASSIUM SERPL-SCNC: 4.2 MMOL/L (ref 3.5–5.1)
PROT SERPL-MCNC: 7.5 G/DL (ref 6.4–8.2)
RBC # BLD AUTO: 4.92 M/UL (ref 3.8–5.2)
SODIUM SERPL-SCNC: 142 MMOL/L (ref 136–145)
TRIGL SERPL-MCNC: 173 MG/DL
TSH SERPL DL<=0.05 MIU/L-ACNC: 2.12 UIU/ML (ref 0.36–3.74)
VLDLC SERPL CALC-MCNC: 34.6 MG/DL
WBC # BLD AUTO: 6.2 K/UL (ref 3.6–11)

## 2024-04-02 NOTE — RESULT ENCOUNTER NOTE
As usual the cholesterol is up.  Your kidneys, liver and electrolytes are great.  Your thyroid test indicates that you are on the appropriate amount of medication and no changes are needed.  The A1c is perfect and there is no evidence of diabetes or prediabetes at this time.  Your cell counts are normal.  You should expect a phone call about your dermatology referral if you have not received that already and please complete the Cologuard once it is sent to your house.

## 2024-04-03 RX ORDER — LEVOTHYROXINE SODIUM 88 UG/1
88 TABLET ORAL
Qty: 90 TABLET | Refills: 4 | Status: SHIPPED | OUTPATIENT
Start: 2024-04-03

## 2024-05-10 LAB — NONINV COLON CA DNA+OCC BLD SCRN STL QL: NEGATIVE

## 2024-09-04 ENCOUNTER — TRANSCRIBE ORDERS (OUTPATIENT)
Facility: HOSPITAL | Age: 74
End: 2024-09-04

## 2024-09-04 DIAGNOSIS — Z12.31 SCREENING MAMMOGRAM FOR BREAST CANCER: Primary | ICD-10-CM

## 2024-09-18 ENCOUNTER — HOSPITAL ENCOUNTER (OUTPATIENT)
Facility: HOSPITAL | Age: 74
Discharge: HOME | End: 2024-09-21
Attending: INTERNAL MEDICINE
Payer: MEDICARE

## 2024-09-18 DIAGNOSIS — Z12.31 SCREENING MAMMOGRAM FOR BREAST CANCER: ICD-10-CM

## 2024-09-18 PROCEDURE — 77063 BREAST TOMOSYNTHESIS BI: CPT

## 2024-09-19 ENCOUNTER — TELEPHONE (OUTPATIENT)
Age: 74
End: 2024-09-19

## 2025-04-20 NOTE — PROGRESS NOTES
Ms. Shemar Berman is a 66 y.o. female who is here for evaluation of   Chief Complaint   Patient presents with    Pre-op Exam     Pre-op for [facility] Castle on 8/2/2022 - Fax number [phone]. Procedure vaginal hysterectomy, possible bilateral salpingo-ooophorectomy, possible uterosacral ligament suspension    Pre-op Exam     Pre-op cataract on 8/17/2022 with Dr Cr Cornejo    . ASSESSMENT AND PLAN:    1. Diminished vision  She is medically stable for surgery for her cataracts    2. Uterine prolapse  She is medically stable for hysterectomy and bladder tack    3. Acquired hypothyroidism  - T4 (THYROXINE); Future  - TSH 3RD GENERATION; Future    4. Pure hypercholesterolemia  - LIPID PANEL; Future  - METABOLIC PANEL, COMPREHENSIVE; Future  - CBC WITH AUTOMATED DIFF; Future    5. Mixed hyperlipidemia  - LIPID PANEL; Future    6. Elevated glucose  - HEMOGLOBIN A1C WITH EAG; Future    7. Pre-op testing  Scheduled for cataract surgery and hysterectomy with bladder tack  - LIPID PANEL; Future  - METABOLIC PANEL, COMPREHENSIVE; Future  - CBC WITH AUTOMATED DIFF; Future  - URINALYSIS W/ REFLEX CULTURE; Future  - PROTHROMBIN TIME + INR;  Future  - PTT; Future  - AMB POC EKG ROUTINE W/ 12 LEADS, INTER & REP      Orders Placed This Encounter    LIPID PANEL     Standing Status:   Future     Standing Expiration Date:   8/5/2022    HEMOGLOBIN A1C WITH EAG     Standing Status:   Future     Standing Expiration Date:   8/5/2022    T4 (THYROXINE)     Standing Status:   Future     Standing Expiration Date:   8/5/2022    TSH 3RD GENERATION     Standing Status:   Future     Standing Expiration Date:   9/9/8472    METABOLIC PANEL, COMPREHENSIVE     Standing Status:   Future     Standing Expiration Date:   8/5/2022    CBC WITH AUTOMATED DIFF     Standing Status:   Future     Standing Expiration Date:   8/5/2022    URINALYSIS W/ REFLEX CULTURE     Standing Status:   Future     Standing Expiration Date:   7/1/2023   Deandre Mistry PROTHROMBIN TIME + INR     Standing Status:   Future     Standing Expiration Date:   7/1/2023    PTT     Standing Status:   Future     Standing Expiration Date:   7/1/2023    AMB POC EKG ROUTINE W/ 12 LEADS, INTER & REP     Order Specific Question:   Reason for Exam:     Answer:   pre-op           HPI  64-year-old woman who has a history of hyperlipidemia and hypothyroidism returns today for interval assessment. Currently takes levothyroxine 88 mcg a day. Due for labs. Her last assessment was 7 months ago and her LDL was 140 and her A1c was 5.7. She arrives today with 2 preoperative requests-1 for cataracts and the other for hysterectomy and a bladder tack. Overall her health is excellent and she typically will walk between 4 and 6 miles daily. ROS:  Denies  fever, chills, cough, chest pain, SOB,  nausea, vomiting, or diarrhea. Denies wt loss, wt gain, hemoptysis, hematochezia or melena. Physical Examination:    Visit Vitals  /77 (BP 1 Location: Left upper arm, BP Patient Position: Sitting, BP Cuff Size: Adult long)   Pulse 83   Temp 98.2 °F (36.8 °C) (Temporal)   Resp 18   Ht 5' 6\" (1.676 m)   Wt 129 lb 9.6 oz (58.8 kg)   SpO2 98%   BMI 20.92 kg/m²      General:  Alert, cooperative, no distress. Head:  Normocephalic, without obvious abnormality, atraumatic. Eyes:  Conjunctivae/corneas clear. Pupils equal, round, reactive to light. Extraocular movements intact. Lungs:   Clear to auscultation bilaterally. Chest wall:  No tenderness or deformity. Cardiac:  RRR   Abdomen:   Soft, non-tender. Bowel sounds normal. No masses. No organomegaly. Extremities: Extremities normal, atraumatic, no cyanosis or edema. Pulses: 2+ and symmetric all extremities. Skin: Skin color, texture, turgor normal. No rashes or lesions. Lymph nodes: Cervical, supraclavicular, and axillary nodes normal.   Neurologic: CNII-XII intact. Normal strength, sensation, and reflexes throughout.      On this date 07/01/2022 I have spent 30 minutes reviewing previous notes, test results and face to face with the patient discussing the diagnosis and importance of compliance with the treatment plan as well as documenting on the day of the visit.     Diandra Castro MD FACP    (signed electronically) on 7/1/2022 at 7:56 AM DC instructions

## 2025-05-31 ENCOUNTER — HOSPITAL ENCOUNTER (OUTPATIENT)
Facility: HOSPITAL | Age: 75
End: 2025-05-31
Payer: MEDICARE

## 2025-05-31 DIAGNOSIS — M79.671 RIGHT FOOT PAIN: Primary | ICD-10-CM

## 2025-05-31 DIAGNOSIS — M79.671 RIGHT FOOT PAIN: ICD-10-CM

## 2025-05-31 PROCEDURE — 73630 X-RAY EXAM OF FOOT: CPT
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
## 2025-06-09 ENCOUNTER — OFFICE VISIT (OUTPATIENT)
Age: 75
End: 2025-06-09
Payer: MEDICARE

## 2025-06-09 VITALS
WEIGHT: 127.4 LBS | DIASTOLIC BLOOD PRESSURE: 75 MMHG | TEMPERATURE: 97.7 F | HEART RATE: 76 BPM | BODY MASS INDEX: 21.23 KG/M2 | OXYGEN SATURATION: 98 % | RESPIRATION RATE: 16 BRPM | HEIGHT: 65 IN | SYSTOLIC BLOOD PRESSURE: 142 MMHG

## 2025-06-09 DIAGNOSIS — S92.901A CLOSED FRACTURE OF RIGHT FOOT, INITIAL ENCOUNTER: Primary | ICD-10-CM

## 2025-06-09 PROCEDURE — 1123F ACP DISCUSS/DSCN MKR DOCD: CPT | Performed by: INTERNAL MEDICINE

## 2025-06-09 PROCEDURE — G8420 CALC BMI NORM PARAMETERS: HCPCS | Performed by: INTERNAL MEDICINE

## 2025-06-09 PROCEDURE — 1126F AMNT PAIN NOTED NONE PRSNT: CPT | Performed by: INTERNAL MEDICINE

## 2025-06-09 PROCEDURE — 1036F TOBACCO NON-USER: CPT | Performed by: INTERNAL MEDICINE

## 2025-06-09 PROCEDURE — 99213 OFFICE O/P EST LOW 20 MIN: CPT | Performed by: INTERNAL MEDICINE

## 2025-06-09 PROCEDURE — G8427 DOCREV CUR MEDS BY ELIG CLIN: HCPCS | Performed by: INTERNAL MEDICINE

## 2025-06-09 PROCEDURE — 1090F PRES/ABSN URINE INCON ASSESS: CPT | Performed by: INTERNAL MEDICINE

## 2025-06-09 PROCEDURE — 1159F MED LIST DOCD IN RCRD: CPT | Performed by: INTERNAL MEDICINE

## 2025-06-09 PROCEDURE — G8399 PT W/DXA RESULTS DOCUMENT: HCPCS | Performed by: INTERNAL MEDICINE

## 2025-06-09 RX ORDER — LEVOTHYROXINE SODIUM 88 UG/1
TABLET ORAL
Qty: 90 TABLET | Refills: 4 | Status: SHIPPED | OUTPATIENT
Start: 2025-06-09

## 2025-06-09 SDOH — ECONOMIC STABILITY: FOOD INSECURITY: WITHIN THE PAST 12 MONTHS, YOU WORRIED THAT YOUR FOOD WOULD RUN OUT BEFORE YOU GOT MONEY TO BUY MORE.: NEVER TRUE

## 2025-06-09 SDOH — ECONOMIC STABILITY: FOOD INSECURITY: WITHIN THE PAST 12 MONTHS, THE FOOD YOU BOUGHT JUST DIDN'T LAST AND YOU DIDN'T HAVE MONEY TO GET MORE.: NEVER TRUE

## 2025-06-09 ASSESSMENT — PATIENT HEALTH QUESTIONNAIRE - PHQ9
2. FEELING DOWN, DEPRESSED OR HOPELESS: NOT AT ALL
SUM OF ALL RESPONSES TO PHQ QUESTIONS 1-9: 0
1. LITTLE INTEREST OR PLEASURE IN DOING THINGS: NOT AT ALL

## 2025-06-09 NOTE — PROGRESS NOTES
Arlette Lemus is a 74 y.o. female presenting for/with:    Chief Complaint   Patient presents with    Foot Pain     Discuss R foot xray .. Still having some burning pain and swelling in the top of her foot        Vitals:    06/09/25 0922   BP: (!) 142/75   BP Site: Left Upper Arm   Patient Position: Sitting   Pulse: 76   Resp: 16   Temp: 97.7 °F (36.5 °C)   TempSrc: Temporal   SpO2: 98%   Weight: 57.8 kg (127 lb 6.4 oz)   Height: 1.651 m (5' 5\")       Pain Scale: 0 - No pain/10  Pain Location:     \"Have you been to the ER, urgent care clinic since your last visit?  Hospitalized since your last visit?\"    NO    “Have you seen or consulted any other health care providers outside of  since your last visit?”    NO                 6/9/2025     9:21 AM   PHQ-9    Little interest or pleasure in doing things 0   Feeling down, depressed, or hopeless 0   PHQ-2 Score 0   PHQ-9 Total Score 0           1/5/2023    12:00 AM   [facility] AMB LEARNING ASSESSMENT   Primary Learner Patient   Primary Language ENGLISH   Learning Preference VIDEOS    PICTURES   Answered By self   Relationship to Learner SELF            6/9/2025     9:22 AM   Amb Fall Risk Assessment and TUG Test   Do you feel unsteady or are you worried about falling?  no   2 or more falls in past year? no   Fall with injury in past year? no           6/9/2025     9:00 AM 4/1/2024     8:00 AM   ADL ASSESSMENT   Feeding yourself No Help Needed No Help Needed   Getting from bed to chair No Help Needed No Help Needed   Getting dressed No Help Needed No Help Needed   Bathing or showering No Help Needed No Help Needed   Walk across the room (includes cane/walker) No Help Needed No Help Needed   Using the telphone No Help Needed No Help Needed   Taking your medications No Help Needed No Help Needed   Preparing meals No Help Needed No Help Needed   Managing money (expenses/bills) No Help Needed No Help Needed   Moderately strenuous housework (laundry) No

## 2025-09-03 ENCOUNTER — TRANSCRIBE ORDERS (OUTPATIENT)
Facility: HOSPITAL | Age: 75
End: 2025-09-03

## 2025-09-03 DIAGNOSIS — Z12.31 SCREENING MAMMOGRAM FOR BREAST CANCER: Primary | ICD-10-CM

## (undated) DEVICE — SHEET,DRAPE,UNDERBUTTOCK,GRAD POUCH,PORT: Brand: MEDLINE

## (undated) DEVICE — B-H IRRIGATING CAN 19GA FLAT ANGLED 8MM: Brand: OPHTHALMIC CANNULA

## (undated) DEVICE — 45° KELMAN®, 0.9 MM TURBOSONICS® MINI-FLARED ABS® TIP: Brand: ALCON, KELMAN, TURBOSONICS, MINI-FLARED ABS

## (undated) DEVICE — SUTURE CV-2 36IN TH-26 DA 1DZ 2N02A

## (undated) DEVICE — SOLUTION IRRIG 250ML STRL H2O PLAS POUR BTL USP

## (undated) DEVICE — TAPE ADH CLTH SILK H2O REPELLENT CURAD

## (undated) DEVICE — OPHTHALMIC KNIFE 15°: Brand: ALCON

## (undated) DEVICE — YANKAUER,BULB TIP,W/O VENT,RIGID,STERILE: Brand: MEDLINE

## (undated) DEVICE — KIT PT CARE CATRCT POSTOP CUST

## (undated) DEVICE — SOL IRR SOD CL 0.9% 250ML BTL --

## (undated) DEVICE — MICROSURGICAL INSTRUMENT CAPSULE POLISHER-37 BEND, 21GA W .3MM SIDE PORT: Brand: ALCON

## (undated) DEVICE — GLOVE SURG SZ 65 L12IN FNGR THK79MIL GRN LTX FREE

## (undated) DEVICE — SOLUTION IRRIG 1000ML 0.9% SOD CHL USP POUR PLAS BTL

## (undated) DEVICE — TAPE ADH W1INXL10YD CLTH SILK H2O RESIST CURAD

## (undated) DEVICE — SUTURE PDS II SZ 3-0 L27IN ABSRB VLT L26MM SH 1/2 CIR Z316H

## (undated) DEVICE — STRAP RESTRAIN W3.5XL19IN TECLIN STRRP POS LEG DURING LITH

## (undated) DEVICE — GOWN,PREVENTION PLUS,XLN/2XL,ST,22/CS: Brand: MEDLINE

## (undated) DEVICE — PAD PT POS 36 IN SURGYPAD DISP

## (undated) DEVICE — SUTURE PDS II SZ 2-0 L27IN ABSRB VLT L26MM CT-2 1/2 CIR Z333H

## (undated) DEVICE — LABEL STERILIZATION W/PEN -- 50/CA

## (undated) DEVICE — SUTURE VCRL SZ 0 L18IN ABSRB VLT L26MM CT-2 1/2 CIR J727D

## (undated) DEVICE — TIPLESS W/ 0.9 MM HIGH INFUSION SLEEVES: Brand: ALCON, INFINITI, MICROSMOOTH

## (undated) DEVICE — GAUZE PK VAG XR DTECT STERIL 2INX9FT

## (undated) DEVICE — BETADINE 5% EYE SOL

## (undated) DEVICE — BLADE ASSEMB CLP HAIR FINE --

## (undated) DEVICE — REM POLYHESIVE ADULT PATIENT RETURN ELECTRODE: Brand: VALLEYLAB

## (undated) DEVICE — GLOVE ORANGE PI 8   MSG9080

## (undated) DEVICE — PAD,SANITARY,11 IN,MAXI,N-STRL,IND WRAP: Brand: MEDLINE

## (undated) DEVICE — HOOK RETRCT L5MM E SHRP SELF RET SYS LONE STAR

## (undated) DEVICE — HANDLE LT SNAP ON ULT DURABLE LENS FOR TRUMPF ALC DISPOSABLE

## (undated) DEVICE — BASIN ST MAJOR-NO CAUTERY: Brand: MEDLINE INDUSTRIES, INC.

## (undated) DEVICE — PREMIUM WET SKIN PREP TRAY: Brand: MEDLINE INDUSTRIES, INC.

## (undated) DEVICE — GLOVE SURG SZ 65 L12IN FNGR THK94MIL STD WHT LTX FREE

## (undated) DEVICE — GARMENT,MEDLINE,DVT,INT,CALF,MED, GEN2: Brand: MEDLINE

## (undated) DEVICE — MICROSURGICAL INSTRUMENT ANTERIOR CHAMBER CANNULA 30GA: Brand: ALCON

## (undated) DEVICE — SOLUTION IRRIGATION STRL H2O 1000 ML UROMATIC CONTAINER

## (undated) DEVICE — SPONGE GZ W4XL4IN COT RADPQ HIGHLY ABSRB

## (undated) DEVICE — TOTAL TRAY, DB, 100% SILI FOLEY, 16FR 10: Brand: MEDLINE

## (undated) DEVICE — DRAPE SURG CYSTO N REINF ST W O FLD PCH STD

## (undated) DEVICE — CYSTO/BLADDER IRRIGATION SET, REGULATING CLAMP

## (undated) DEVICE — INFECTION CONTROL KIT SYS

## (undated) DEVICE — PACK,BASIC,SIRUS,V: Brand: MEDLINE

## (undated) DEVICE — MICROSURGICAL INSTRUMENT IRR. CYSTITOME 25GA FORMED-REVERSE CUTTING: Brand: ALCON

## (undated) DEVICE — Device

## (undated) DEVICE — SUTURE VCRL SZ 2-0 L27IN ABSRB UD L26MM SH 1/2 CIR J417H

## (undated) DEVICE — SYR 10ML CTRL LR LCK NSAF LF --

## (undated) DEVICE — COVER,TABLE,60X90,STERILE: Brand: MEDLINE

## (undated) DEVICE — PENCIL SMK EVAC 10 FT BLADE ELECTRD ROCKER FOR TELSCP

## (undated) DEVICE — NEEDLE SUT SZ 6 S STL MAYO CATGUT 1/2 CIR TAPR PNT DISP